# Patient Record
Sex: FEMALE | Race: WHITE | Employment: FULL TIME | URBAN - METROPOLITAN AREA
[De-identification: names, ages, dates, MRNs, and addresses within clinical notes are randomized per-mention and may not be internally consistent; named-entity substitution may affect disease eponyms.]

---

## 2017-02-15 ENCOUNTER — TELEPHONE (OUTPATIENT)
Dept: OBGYN | Facility: CLINIC | Age: 22
End: 2017-02-15

## 2017-02-15 ENCOUNTER — PRENATAL OFFICE VISIT (OUTPATIENT)
Dept: OBGYN | Facility: CLINIC | Age: 22
End: 2017-02-15
Payer: COMMERCIAL

## 2017-02-15 VITALS
HEART RATE: 86 BPM | SYSTOLIC BLOOD PRESSURE: 127 MMHG | TEMPERATURE: 98.9 F | DIASTOLIC BLOOD PRESSURE: 69 MMHG | BODY MASS INDEX: 32.28 KG/M2 | WEIGHT: 194 LBS

## 2017-02-15 DIAGNOSIS — Z32.01 POSITIVE PREGNANCY TEST: ICD-10-CM

## 2017-02-15 DIAGNOSIS — Z34.01 ENCOUNTER FOR SUPERVISION OF NORMAL FIRST PREGNANCY IN FIRST TRIMESTER: Primary | ICD-10-CM

## 2017-02-15 DIAGNOSIS — B35.4 DERMATOPHYTOSIS OF BODY: ICD-10-CM

## 2017-02-15 LAB
ABO + RH BLD: NORMAL
ABO + RH BLD: NORMAL
BETA HCG QUAL IFA URINE: POSITIVE
BLD GP AB SCN SERPL QL: NORMAL
BLOOD BANK CMNT PATIENT-IMP: NORMAL
ERYTHROCYTE [DISTWIDTH] IN BLOOD BY AUTOMATED COUNT: 13.1 % (ref 10–15)
HBV SURFACE AG SERPL QL IA: NONREACTIVE
HCT VFR BLD AUTO: 38 % (ref 35–47)
HGB BLD-MCNC: 13.1 G/DL (ref 11.7–15.7)
HIV 1+2 AB+HIV1 P24 AG SERPL QL IA: NORMAL
MCH RBC QN AUTO: 27.8 PG (ref 26.5–33)
MCHC RBC AUTO-ENTMCNC: 34.5 G/DL (ref 31.5–36.5)
MCV RBC AUTO: 81 FL (ref 78–100)
PLATELET # BLD AUTO: 240 10E9/L (ref 150–450)
RBC # BLD AUTO: 4.72 10E12/L (ref 3.8–5.2)
SPECIMEN EXP DATE BLD: NORMAL
T PALLIDUM IGG+IGM SER QL: NEGATIVE
WBC # BLD AUTO: 7.6 10E9/L (ref 4–11)

## 2017-02-15 PROCEDURE — 36415 COLL VENOUS BLD VENIPUNCTURE: CPT | Performed by: OBSTETRICS & GYNECOLOGY

## 2017-02-15 PROCEDURE — 86780 TREPONEMA PALLIDUM: CPT | Performed by: OBSTETRICS & GYNECOLOGY

## 2017-02-15 PROCEDURE — 86901 BLOOD TYPING SEROLOGIC RH(D): CPT | Performed by: OBSTETRICS & GYNECOLOGY

## 2017-02-15 PROCEDURE — 86850 RBC ANTIBODY SCREEN: CPT | Performed by: OBSTETRICS & GYNECOLOGY

## 2017-02-15 PROCEDURE — 87491 CHLMYD TRACH DNA AMP PROBE: CPT | Performed by: OBSTETRICS & GYNECOLOGY

## 2017-02-15 PROCEDURE — 87389 HIV-1 AG W/HIV-1&-2 AB AG IA: CPT | Performed by: OBSTETRICS & GYNECOLOGY

## 2017-02-15 PROCEDURE — 99207 ZZC FIRST OB VISIT: CPT | Performed by: OBSTETRICS & GYNECOLOGY

## 2017-02-15 PROCEDURE — 87591 N.GONORRHOEAE DNA AMP PROB: CPT | Performed by: OBSTETRICS & GYNECOLOGY

## 2017-02-15 PROCEDURE — 87340 HEPATITIS B SURFACE AG IA: CPT | Performed by: OBSTETRICS & GYNECOLOGY

## 2017-02-15 PROCEDURE — 86900 BLOOD TYPING SEROLOGIC ABO: CPT | Performed by: OBSTETRICS & GYNECOLOGY

## 2017-02-15 PROCEDURE — 86762 RUBELLA ANTIBODY: CPT | Performed by: OBSTETRICS & GYNECOLOGY

## 2017-02-15 PROCEDURE — 85027 COMPLETE CBC AUTOMATED: CPT | Performed by: OBSTETRICS & GYNECOLOGY

## 2017-02-15 PROCEDURE — 84703 CHORIONIC GONADOTROPIN ASSAY: CPT | Performed by: OBSTETRICS & GYNECOLOGY

## 2017-02-15 RX ORDER — KETOCONAZOLE 20 MG/ML
SHAMPOO TOPICAL
Qty: 120 ML | Refills: 1 | Status: SHIPPED | OUTPATIENT
Start: 2017-02-15 | End: 2017-06-21

## 2017-02-15 NOTE — NURSING NOTE
"Chief Complaint   Patient presents with     Prenatal Care     1st OB visit     Confirmation Of Pregnancy       Initial /69 (BP Location: Left arm, Patient Position: Chair, Cuff Size: Adult Regular)  Pulse 86  Temp 98.9  F (37.2  C) (Oral)  Wt 194 lb (88 kg)  LMP 12/14/2016 (Exact Date)  BMI 32.28 kg/m2 Estimated body mass index is 32.28 kg/(m^2) as calculated from the following:    Height as of 4/21/15: 5' 5\" (1.651 m).    Weight as of this encounter: 194 lb (88 kg).  Medication Reconciliation: complete    "

## 2017-02-15 NOTE — PATIENT INSTRUCTIONS
If you have any questions regarding your visit, Please contact your care team.     [employer] Access Services: [phone]    [facility] CLINIC HOURS TELEPHONE NUMBER   KAREN Deyr-    Jerald Ospina-MARGARET Li-Medical Assistant   Monday-[place]  8:00a.m-4:45 p.m  Wednesday-[place] 8:00a.m-4:45 p.m.  Thursday-[place]  8:00a.m-4:45 p.m.  Friday-[place]  8:00a.m-4:45 p.m. [facility]  [address]  [place], [place] [address] [phone] ask [facility]  [phone] Fax  Imaging Uamzebfogc-[phone]    [facility] Labor and Delivery  [address] [place] [address] Dr.  [place], [place] [address] [phone]    [facility]  [address] [place] [address]  [phone] ask [facility]  [phone] Fax  Imaging Akllgnyfra-[phone]     Urgent Care locations:    [place]        [place] Monday-Friday  5 pm - 9 pm  Saturday and Sunday   9 am - 5 pm    Monday-Friday   11 am - 9 pm  Saturday and Sunday   9 am - 5 pm   [phone]       If you need a medication refill, please contact your pharmacy. Please allow 3 business days for your refill to be completed.  As always, Thank you for trusting us with your healthcare needs!

## 2017-02-15 NOTE — TELEPHONE ENCOUNTER
ketoconazole (NIZORAL) 2 % shampoo 120 mL 1 2/15/2017  No   Sig: Apply to the affected area and wash off after 5 minutes     Unsure how many times a week Dr. Ruiz wants pt to use this shampoo.    Will route to Dr. Ruiz for review & orders. Anai Owens RN, BAN

## 2017-02-15 NOTE — TELEPHONE ENCOUNTER
Re: KETOCONAZOLE SHAMPOO    Question on how often to use shampoo    To clarify for quantity    nataly (Pharmacy) [phone]       Ok to leave message

## 2017-02-15 NOTE — MR AVS SNAPSHOT
After Visit Summary   2/15/2017    Nayla Mcmanus    MRN: [id number]           Patient Information     Date Of Birth          1995        Visit Information        Provider Department      2/15/2017 8:30 AM Ankur Ruiz MD [facility]        Today's Diagnoses     Positive pregnancy test    -  1      Care Instructions                                                        If you have any questions regarding your visit, Please contact your care team.     Critical access hospital Services: [phone]    [facility] CLINIC HOURS TELEPHONE NUMBER   KAREN Dyer-    Jerald Ospina-MARGARET Li-Medical Assistant   Monday-[place]  8:00a.m-4:45 p.m  Wednesday-[place] 8:00a.m-4:45 p.m.  Thursday-[place]  8:00a.m-4:45 p.m.  Friday-[place]  8:00a.m-4:45 p.m. [facility]  [address]  [place], [place] [address] [phone] ask for [facility]  [phone] Fax  Imaging [id number]-[phone]    [facility] Labor and Delivery  [address] [place] [address] Dr.  [place], [place] [address] [phone]    [facility]  [address] [place], [place] [address] [phone] ask [facility]  [phone] Fax  Imaging [phone]     Urgent Care locations:    [facility] Monday-Friday  5 pm - 9 pm  Saturday and Sunday   9 am - 5 pm    Monday-Friday   11 am - 9 pm  Saturday and Sunday   9 am - 5 pm   [phone]       If you need a medication refill, please contact your pharmacy. Please allow 3 business days for your refill to be completed.  As always, Thank you for trusting us with your healthcare needs!          Follow-ups after your visit        Who to contact     If you have questions or need follow up information about today's clinic visit or your schedule please contact [facility] directly at [phone].  Normal or non-critical  "lab and imaging results will be communicated to you by MyChart, letter or phone within 4 business days after the clinic has received the results. If you do not hear from us within 7 days, please contact the clinic through [employer] or phone. If you have a critical or abnormal lab result, we will notify you by phone as soon as possible.  Submit refill requests through DelaGet or call your pharmacy and they will forward the refill request to us. Please allow 3 business days for your refill to be completed.          Additional Information About Your Visit        Dine[employer] Information     DelaGet lets you send messages to your doctor, view your test results, renew your prescriptions, schedule appointments and more. To sign up, go to [email or web address] . Click on \"Log in\" on the left side of the screen, which will take you to the Welcome page. Then click on \"Sign up Now\" on the right side of the page.     You will be asked to enter the access code listed below, as well as some personal information. Please follow the directions to create your username and password.     Your access code is: [email or web address]  Expires: 2017  8:51 AM     Your access code will  in 90 days. If you need help or a new code, please call your [place] clinic or [phone].        Care EveryWhere ID     This is your Care EveryWhere ID. This could be used by other organizations to access your [place] medical records  [id number]        Your Vitals Were     Pulse Temperature Last Period BMI (Body Mass Index)          86 98.9  F (37.2  C) (Oral) 2016 (Exact Date) 32.28 kg/m2         Blood Pressure from Last 3 Encounters:   02/15/17 127/69   10/24/16 121/76   04/15/16 126/73    Weight from Last 3 Encounters:   02/15/17 194 lb (88 kg)   10/24/16 195 lb 12.8 oz (88.8 kg)   04/15/16 186 lb 9.6 oz (84.6 kg)              We Performed the Following     Beta HCG qual IFA urine        Primary Care Provider Office Phone # Fax #    Emily Jarrett, " [phone]       [place] [address]           [facility] [address]        Thank you!     Thank you for choosing [facility]  for your care. Our goal is always to provide you with excellent care. Hearing back from our patients is one way we can continue to improve our services. Please take a few minutes to complete the written survey that you may receive in the mail after your visit with us. Thank you!             Your Updated Medication List - Protect others around you: Learn how to safely use, store and throw away your medicines at www.disposemymeds.org.      Notice  As of 2/15/2017  8:51 AM    You have not been prescribed any medications.

## 2017-02-16 ENCOUNTER — RADIANT APPOINTMENT (OUTPATIENT)
Dept: ULTRASOUND IMAGING | Facility: CLINIC | Age: 22
End: 2017-02-16
Attending: OBSTETRICS & GYNECOLOGY
Payer: COMMERCIAL

## 2017-02-16 DIAGNOSIS — Z32.01 POSITIVE PREGNANCY TEST: ICD-10-CM

## 2017-02-16 PROBLEM — Z34.80 SUPERVISION OF OTHER NORMAL PREGNANCY, ANTEPARTUM: Status: ACTIVE | Noted: 2017-02-16

## 2017-02-16 PROBLEM — Z29.13 NEED FOR RHOGAM DUE TO RH NEGATIVE MOTHER: Status: ACTIVE | Noted: 2017-02-16

## 2017-02-16 LAB
C TRACH DNA SPEC QL NAA+PROBE: NORMAL
N GONORRHOEA DNA SPEC QL NAA+PROBE: NORMAL
RUBV IGG SERPL IA-ACNC: 67 IU/ML
SPECIMEN SOURCE: NORMAL
SPECIMEN SOURCE: NORMAL

## 2017-02-16 PROCEDURE — 76801 OB US < 14 WKS SINGLE FETUS: CPT | Performed by: RADIOLOGY

## 2017-02-16 NOTE — TELEPHONE ENCOUNTER
ketoconazole (NIZORAL) 2 % shampoo 120 mL 1 2/15/2017  No   Sig: Apply to the affected area and wash off after 5 minutes     Phone call to pharmacist, Janine, at [phone]. Gave her orders per Dr. Ruiz as below. Anai Owens RN, BAN

## 2017-02-17 NOTE — PROGRESS NOTES
"\"O LAY see uh\" Nayla Mcmanus is a 21 year old year old G 2 P 0 who presents for pregnancy confirmation and an initial obstetrical visit.  Referred by sisters/pts.    Currently experiencing normal pregnancy symptoms without particular problems including pain, bleeding, marked vomiting or weight loss except: daily N/V.  This was a semi-planned pregnancy. LMP 12/14. Menses every 28-29 d.   Here today alone.   Additional concerns: history of SAB x 1, recurrent skin fungal rash on chest and arms.     ROS:     Systems reviewed include constitutional; breast;                 cardiac; respiratory; gastrointestinal; genitourinary;                                musculoskeletal; integumentary; psychological;                                hematologic/lymphatic and endocrine.                  These systems were negative for significant symptoms except                 for the following: none and see above HPI.    Past medical, obstetrical, surgical, family and social history reviewed and as noted or updated in chart.     Allergies, meds and supplements are as noted or updated in chart.                    Episode OB dating, demographic and history forms completed or reviewed.    EXAM:  VS as noted. BMI- Body mass index is 32.28 kg/(m^2).    Relatively recent normal general exam- not repeated now.       ASSESSMENT: (Z32.01) Positive pregnancy test  (primary encounter diagnosis)  Comment:   Plan: Beta HCG qual IFA urine, CBC WITH PLATELETS,         HIV Antigen Antibody Combo, Rubella Antibody         IgG Quantitative, Hepatitis B surface antigen,         Anti Treponema, ABO/RH TYPE AND SCREEN,         NEISSERIA GONORRHOEA PCR, CHLAMYDIA TRACHOMATIS        PCR, CANCELED:  OB <14 Weeks w Transvaginal         Single            (Z34.01) Encounter for supervision of normal first pregnancy in first trimester  Comment:   Plan: CBC WITH PLATELETS, HIV Antigen Antibody Combo,        Rubella Antibody IgG Quantitative, Hepatitis B        "  surface antigen, Anti Treponema, ABO/RH TYPE         AND SCREEN, NEISSERIA GONORRHOEA PCR, CHLAMYDIA        TRACHOMATIS PCR            (B35.4) Dermatophytosis of body  Comment:   Plan: ketoconazole (NIZORAL) 2 % shampoo               PLAN:  Anticipatory guidance as noted. Symptoms, problems and concerns reviewed. Recommended weight gain discussed. Problem list initiated. Check u/s now. Orders as noted. RTC in 4 weeks. UC, possible Pap, and discuss special screening tests next.    Ankur Ruiz MD

## 2017-03-16 ENCOUNTER — PRENATAL OFFICE VISIT (OUTPATIENT)
Dept: OBGYN | Facility: CLINIC | Age: 22
End: 2017-03-16
Payer: COMMERCIAL

## 2017-03-16 VITALS
SYSTOLIC BLOOD PRESSURE: 123 MMHG | WEIGHT: 192 LBS | HEART RATE: 90 BPM | BODY MASS INDEX: 31.95 KG/M2 | DIASTOLIC BLOOD PRESSURE: 71 MMHG | TEMPERATURE: 98.7 F

## 2017-03-16 DIAGNOSIS — Z34.80 SUPERVISION OF OTHER NORMAL PREGNANCY, ANTEPARTUM: Primary | ICD-10-CM

## 2017-03-16 DIAGNOSIS — Z29.13 NEED FOR RHOGAM DUE TO RH NEGATIVE MOTHER: ICD-10-CM

## 2017-03-16 PROCEDURE — 99207 ZZC PRENATAL VISIT: CPT | Performed by: OBSTETRICS & GYNECOLOGY

## 2017-03-16 PROCEDURE — 87086 URINE CULTURE/COLONY COUNT: CPT | Performed by: OBSTETRICS & GYNECOLOGY

## 2017-03-16 NOTE — MR AVS SNAPSHOT
After Visit Summary   3/16/2017    Nayla Mcmanus    MRN: [id number]           Patient Information     Date Of Birth          1995        Visit Information        Provider Department      3/16/2017 3:45 PM Ankur Ruiz MD [facility]        Today's Diagnoses     Supervision of other normal pregnancy, antepartum        Need for rhogam due to Rh negative mother          Care Instructions                                                        If you have any questions regarding your visit, Please contact your care team.     [facility] Access Services: [phone]    [facility] CLINIC HOURS TELEPHONE NUMBER   Ankur Ruiz M.D.      Courtney-    Jerald Ospina-MARGARET Li-Medical Assistant   Monday-[place]  8:00a.m-4:45 p.m  Wednesday-[place] 8:00a.m-4:45 p.m.  Thursday-[place]  8:00a.m-4:45 p.m.  Friday-[place]  8:00a.m-4:45 p.m. [facility]  [address] [place] [address]  [phone] ask for [facility]  [phone] Fax  Imaging [phone]    [facility] Labor and Delivery  [address] [place] [address] [place] [address]  [phone]    [facility]  [address] [place] [address]  [phone] ask for [facility]  [phone] Fax  Imaging [phone]     Urgent Care locations:    [facility] Monday-Friday  5 pm - 9 pm  Saturday and Sunday   9 am - 5 pm    Monday-Friday   11 am - 9 pm  Saturday and Sunday   9 am - 5 pm   [phone]       If you need a medication refill, please contact your pharmacy. Please allow 3 business days for your refill to be completed.  As always, Thank you for trusting us with your healthcare needs!          Follow-ups after your visit        Who to contact     If you have questions or need follow up information about today's clinic visit or your schedule please contact [place]  [facility] directly at [phone].  Normal or non-critical lab and imaging results will be communicated to you by MyChart, letter or phone within 4 business days after the clinic has received the results. If you do not hear from us within 7 days, please contact the clinic through Offsite Care Resourceshart or phone. If you have a critical or abnormal lab result, we will notify you by phone as soon as possible.  Submit refill requests through [employer] or call your pharmacy and they will forward the refill request to us. Please allow 3 business days for your refill to be completed.          Additional Information About Your Visit        Off[employer] Care Resourceshar[employer]ET [employer] Information     [employer] lets you send messages to your doctor, view your test results, renew your prescriptions, schedule appointments and more. To sign up, go to [email or web address] . Click on \"Log in\" on the left side of the screen, which will take you to the Welcome page. Then click on \"Sign up Now\" on the right side of the page.     You will be asked to enter the access code listed below, as well as some personal information. Please follow the directions to create your username and password.     Your access code is: [email or web address]  Expires: 2017  9:51 AM     Your access code will  in 90 days. If you need help or a new code, please call your [place] clinic or [phone].        Care EveryWhere ID     This is your Care EveryWhere ID. This could be used by other organizations to access your [place] medical records  [id number]        Your Vitals Were     Pulse Temperature Last Period Breastfeeding? BMI (Body Mass Index)       90 98.7  F (37.1  C) (Oral) 2016 (Exact Date) No 31.95 kg/m2        Blood Pressure from Last 3 Encounters:   17 123/71   02/15/17 127/69   10/24/16 121/76    Weight from Last 3 Encounters:   17 192 lb (87.1 kg)   02/15/17 194 lb (88 kg)   10/24/16 195 lb 12.8 oz (88.8 kg)              Today, you had the following     No orders " found for display       Primary Care Provider Office Phone # Fax #    Emily Jarrett [phone]       [address] [address]        Thank you!     Thank you for choosing [facility]  for your care. Our goal is always to provide you with excellent care. Hearing back from our patients is one way we can continue to improve our services. Please take a few minutes to complete the written survey that you may receive in the mail after your visit with us. Thank you!             Your Updated Medication List - Protect others around you: Learn how to safely use, store and throw away your medicines at www.disposemymeds.org.          This list is accurate as of: 3/16/17  3:52 PM.  Always use your most recent med list.                   Brand Name Dispense Instructions for use    ketoconazole 2 % shampoo    NIZORAL    120 mL    Apply to the affected area and wash off after 5 minutes.

## 2017-03-16 NOTE — PATIENT INSTRUCTIONS
If you have any questions regarding your visit, Please contact your care team.     "[employer] Access Services: [phone]    [facility] CLINIC HOURS TELEPHONE NUMBER   KAREN Dyer-    Jerald Ospina-MARGARET Li-Medical Assistant   Monday-[place]  8:00a.m-4:45 p.m  Wednesday-[place] 8:00a.m-4:45 p.m.  Thursday-[place]  8:00a.m-4:45 p.m.  Friday-[place]  8:00a.m-4:45 p.m. [facility]  [address]  [place], [place] [address] [phone] ask [facility]  [phone] Fax  Imaging [id number]-[phone]    [facility] Labor and Delivery  [address] [place] [address] Dr.  [place], [place] [address] [phone]    [facility]  [address] [place] [address]  [phone] ask [facility]  [phone] Fax  Imaging Zsosxockym-[phone]     Urgent Care locations:    [place]        [place] Monday-Friday  5 pm - 9 pm  Saturday and Sunday   9 am - 5 pm    Monday-Friday   11 am - 9 pm  Saturday and Sunday   9 am - 5 pm   [phone]       If you need a medication refill, please contact your pharmacy. Please allow 3 business days for your refill to be completed.  As always, Thank you for trusting us with your healthcare needs!

## 2017-03-16 NOTE — NURSING NOTE
"Chief Complaint   Patient presents with     Prenatal Care     OBV 13w1d       Initial /71 (BP Location: Left arm, Patient Position: Chair, Cuff Size: Adult Regular)  Pulse 90  Temp 98.7  F (37.1  C) (Oral)  Wt 192 lb (87.1 kg)  LMP 12/14/2016 (Exact Date)  Breastfeeding? No  BMI 31.95 kg/m2 Estimated body mass index is 31.95 kg/(m^2) as calculated from the following:    Height as of 4/21/15: 5' 5\" (1.651 m).    Weight as of this encounter: 192 lb (87.1 kg).  Medication Reconciliation: complete   Guillermo Engel CMA      "

## 2017-03-17 LAB
BACTERIA SPEC CULT: NORMAL
MICRO REPORT STATUS: NORMAL
SPECIMEN SOURCE: NORMAL

## 2017-03-17 NOTE — PROGRESS NOTES
Doing well. No signif signs, symptoms or concerns except rash seems better and will defer topical shampoo treatment.Plan to do Pap scr pp.   Discussed special diagnostic and screening tests and plans (y = yes, n = no/declined): quad scr = n, survey u/s = y, Level 2 survey u/s with MFM = n, CF carrier scr = n, hemoglobinopathy or thalassemia scr= n, SMA, fragile X  and other genetic scr= n, 1st trimester scr with NT and later AFP or with cell free fetal DNA and later AFP = n, genetic amnio/CVS = n.  Advice as per Checklist update. Discussed condition, tests and care plan including RBA. Problem list updated.   Ankur Ruiz MD

## 2017-04-13 ENCOUNTER — PRENATAL OFFICE VISIT (OUTPATIENT)
Dept: OBGYN | Facility: CLINIC | Age: 22
End: 2017-04-13
Payer: COMMERCIAL

## 2017-04-13 VITALS
DIASTOLIC BLOOD PRESSURE: 73 MMHG | BODY MASS INDEX: 31.45 KG/M2 | HEART RATE: 84 BPM | SYSTOLIC BLOOD PRESSURE: 123 MMHG | WEIGHT: 189 LBS | TEMPERATURE: 98.6 F

## 2017-04-13 DIAGNOSIS — Z29.13 NEED FOR RHOGAM DUE TO RH NEGATIVE MOTHER: ICD-10-CM

## 2017-04-13 DIAGNOSIS — Z34.80 SUPERVISION OF OTHER NORMAL PREGNANCY, ANTEPARTUM: ICD-10-CM

## 2017-04-13 PROCEDURE — 99207 ZZC PRENATAL VISIT: CPT | Performed by: OBSTETRICS & GYNECOLOGY

## 2017-04-13 RX ORDER — PRENATAL VIT/IRON FUM/FOLIC AC 27MG-0.8MG
1 TABLET ORAL DAILY
COMMUNITY

## 2017-04-13 NOTE — PATIENT INSTRUCTIONS
If you have any questions regarding your visit, Please contact your care team.     [employer] Access Services: [phone]    [facility] CLINIC HOURS TELEPHONE NUMBER   KAREN Dyer-    Jerald Ospina-MARGARET Li-Medical Assistant   Monday-[place]  8:00a.m-4:45 p.m  Wednesday-[place] 8:00a.m-4:45 p.m.  Thursday-[place]  8:00a.m-4:45 p.m.  Friday-[place]  8:00a.m-4:45 p.m. [facility]  [address]  [place], [place] [address] [phone] ask [facility]  [phone] Fax  Imaging [id number]-[phone]    [facility] Labor and Delivery  [address] [place] [address] Dr.  [place], [place] [address] [phone]    [facility]  [address] [place] [address]  [phone] ask [facility]  [phone] Fax  Imaging Fectguwush-[phone]     Urgent Care locations:    [place]        [place] Monday-Friday  5 pm - 9 pm  Saturday and Sunday   9 am - 5 pm    Monday-Friday   11 am - 9 pm  Saturday and Sunday   9 am - 5 pm   [phone]       If you need a medication refill, please contact your pharmacy. Please allow 3 business days for your refill to be completed.  As always, Thank you for trusting us with your healthcare needs!

## 2017-04-13 NOTE — MR AVS SNAPSHOT
After Visit Summary   4/13/2017    Nayla Mcmanus    MRN: [id number]           Patient Information     Date Of Birth          1995        Visit Information        Provider Department      4/13/2017 3:45 PM Ankur Ruiz MD [facility]        Today's Diagnoses     Supervision of other normal pregnancy, antepartum        Need for rhogam due to Rh negative mother          Care Instructions                                                        If you have any questions regarding your visit, Please contact your care team.     [facility] Access Services: [phone]    [facility] CLINIC HOURS TELEPHONE NUMBER   Ankur Ruiz M.D.      Courtney-    Jerald Ospina-MARGARET Li-Medical Assistant   Monday-[place]  8:00a.m-4:45 p.m  Wednesday-[place] 8:00a.m-4:45 p.m.  Thursday-[place]  8:00a.m-4:45 p.m.  Friday-[place]  8:00a.m-4:45 p.m. [facility]  [address] [place] [address]  [phone] ask for [facility]  [phone] Fax  Imaging [phone]    [facility] Labor and Delivery  [address] [place] [address] [place] [address]  [phone]    [facility]  [address] [place] [address]  [phone] ask for [facility]  [phone] Fax  Imaging [id number]-[phone]     Urgent Care locations:    [facility] Monday-Friday  5 pm - 9 pm  Saturday and Sunday   9 am - 5 pm    Monday-Friday   11 am - 9 pm  Saturday and Sunday   9 am - 5 pm   [phone]       If you need a medication refill, please contact your pharmacy. Please allow 3 business days for your refill to be completed.  As always, Thank you for trusting us with your healthcare needs!          Follow-ups after your visit        Who to contact     If you have questions or need follow up information about today's clinic visit or your schedule please contact [place]  [facility] directly at [phone].  Normal or non-critical lab and imaging results will be communicated to you by MyChart, letter or phone within 4 business days after the clinic has received the results. If you do not hear from us within 7 days, please contact the clinic through [employer] Capital[employer] or phone. If you have a critical or abnormal lab result, we will notify you by phone as soon as possible.  Submit refill requests through Answer.To or call your pharmacy and they will forward the refill request to us. Please allow 3 business days for your refill to be completed.          Additional Information About Your Visit        [employer] Information     Answer.To gives you secure access to your electronic health record. If you see a primary care provider, you can also send messages to your care team and make appointments. If you have questions, please call your primary care clinic.  If you do not have a primary care provider, please call [phone] and they will assist you.        Care EveryWhere ID     This is your Care EveryWhere ID. This could be used by other organizations to access your [place] medical records  [id number]        Your Vitals Were     Pulse Temperature Last Period Breastfeeding? BMI (Body Mass Index)       84 98.6  F (37  C) (Oral) 12/14/2016 (Exact Date) No 31.45 kg/m2        Blood Pressure from Last 3 Encounters:   04/13/17 123/73   03/16/17 123/71   02/15/17 127/69    Weight from Last 3 Encounters:   04/13/17 189 lb (85.7 kg)   03/16/17 192 lb (87.1 kg)   02/15/17 194 lb (88 kg)              Today, you had the following     No orders found for display       Primary Care Provider Office Phone # Fax #    Emily Jarrett [phone]       [address] [address]        Thank you!     Thank you for choosing [facility]  for your care. Our goal is always to provide you with excellent care. Hearing back from our patients  is one way we can continue to improve our services. Please take a few minutes to complete the written survey that you may receive in the mail after your visit with us. Thank you!             Your Updated Medication List - Protect others around you: Learn how to safely use, store and throw away your medicines at www.disposemymeds.org.          This list is accurate as of: 4/13/17  3:50 PM.  Always use your most recent med list.                   Brand Name Dispense Instructions for use    ketoconazole 2 % shampoo    NIZORAL    120 mL    Apply to the affected area and wash off after 5 minutes.       prenatal multivitamin  plus iron 27-0.8 MG Tabs per tablet      Take 1 tablet by mouth daily

## 2017-04-13 NOTE — NURSING NOTE
"Chief Complaint   Patient presents with     Prenatal Care     OBV 17w1d       Initial /73 (BP Location: Left arm, Patient Position: Chair, Cuff Size: Adult Regular)  Pulse 84  Temp 98.6  F (37  C) (Oral)  Wt 189 lb (85.7 kg)  LMP 12/14/2016 (Exact Date)  Breastfeeding? No  BMI 31.45 kg/m2 Estimated body mass index is 31.45 kg/(m^2) as calculated from the following:    Height as of 4/21/15: 5' 5\" (1.651 m).    Weight as of this encounter: 189 lb (85.7 kg).  Medication Reconciliation: complete   Guillermo Engel CMA      "

## 2017-04-14 NOTE — PROGRESS NOTES
No signif signs, symptoms or concerns except poor sleep. Advice appropriate to gestational age reviewed including pertinent Checklist items. Discussed condition, tests and care plan including RBA. Problem list updated. Survey u/s next.  A/P:  Nayla was seen today for prenatal care.    Diagnoses and all orders for this visit:    Supervision of other normal pregnancy, antepartum  -     US OB > 14 Weeks Complete Single; Future    Need for rhogam due to Rh negative mother        Ankur Ruiz MD

## 2017-05-10 ENCOUNTER — RADIANT APPOINTMENT (OUTPATIENT)
Dept: ULTRASOUND IMAGING | Facility: CLINIC | Age: 22
End: 2017-05-10
Attending: OBSTETRICS & GYNECOLOGY
Payer: COMMERCIAL

## 2017-05-10 DIAGNOSIS — Z34.80 SUPERVISION OF OTHER NORMAL PREGNANCY, ANTEPARTUM: ICD-10-CM

## 2017-05-10 PROCEDURE — 76805 OB US >/= 14 WKS SNGL FETUS: CPT

## 2017-05-18 ENCOUNTER — PRENATAL OFFICE VISIT (OUTPATIENT)
Dept: OBGYN | Facility: CLINIC | Age: 22
End: 2017-05-18
Payer: COMMERCIAL

## 2017-05-18 VITALS
WEIGHT: 189 LBS | TEMPERATURE: 97.8 F | HEART RATE: 83 BPM | BODY MASS INDEX: 31.45 KG/M2 | DIASTOLIC BLOOD PRESSURE: 74 MMHG | SYSTOLIC BLOOD PRESSURE: 124 MMHG

## 2017-05-18 DIAGNOSIS — Z29.13 NEED FOR RHOGAM DUE TO RH NEGATIVE MOTHER: ICD-10-CM

## 2017-05-18 DIAGNOSIS — Z34.80 SUPERVISION OF OTHER NORMAL PREGNANCY, ANTEPARTUM: ICD-10-CM

## 2017-05-18 PROCEDURE — 99207 ZZC PRENATAL VISIT: CPT | Performed by: OBSTETRICS & GYNECOLOGY

## 2017-05-18 NOTE — NURSING NOTE
"Chief Complaint   Patient presents with     Prenatal Care     OBV 22w1d       Initial /74 (BP Location: Left arm, Patient Position: Chair, Cuff Size: Adult Regular)  Pulse 83  Temp 97.8  F (36.6  C) (Oral)  Wt 189 lb (85.7 kg)  LMP 12/14/2016 (Exact Date)  Breastfeeding? No  BMI 31.45 kg/m2 Estimated body mass index is 31.45 kg/(m^2) as calculated from the following:    Height as of 4/21/15: 5' 5\" (1.651 m).    Weight as of this encounter: 189 lb (85.7 kg).  Medication Reconciliation: complete   Guillermo Engel CMA      "

## 2017-05-18 NOTE — PATIENT INSTRUCTIONS
If you have any questions regarding your visit, Please contact your care team.     [employer] Access Services: [phone]    [facility] CLINIC HOURS TELEPHONE NUMBER   KAREN Dyer-    Jerald Ospina-MARGARET Li-Medical Assistant   Monday-[place]  8:00a.m-4:45 p.m  Wednesday-[place] 8:00a.m-4:45 p.m.  Thursday-[place]  8:00a.m-4:45 p.m.  Friday-[place]  8:00a.m-4:45 p.m. [facility]  [address]  [place], [place] [address] [phone] ask [facility] [place] [facility]  [phone] Fax  Imaging [phone]    [facility] Labor and Delivery  [address] [place] [address] Dr.  [place], [place] [address] [phone]    [facility]  [address] [place] [address]  [phone] ask [facility]  [phone] Fax  Imaging [phone]     Urgent Care locations:    [place]        [place] Monday-Friday  5 pm - 9 pm  Saturday and Sunday   9 am - 5 pm    Monday-Friday   11 am - 9 pm  Saturday and Sunday   9 am - 5 pm   [phone]       If you need a medication refill, please contact your pharmacy. Please allow 3 business days for your refill to be completed.  As always, Thank you for trusting us with your healthcare needs!

## 2017-05-18 NOTE — PROGRESS NOTES
No signif signs, symptoms or concerns. Advice appropriate to gestational age reviewed including pertinent Checklist items. Discussed condition, tests and care plan including RBA. Problem list updated. 1h GTT and Rhogam next.  A/P:  Nayla was seen today for prenatal care.    Diagnoses and all orders for this visit:    Supervision of other normal pregnancy, antepartum    Need for rhogam due to Rh negative mother        Ankur Ruiz MD

## 2017-05-18 NOTE — MR AVS SNAPSHOT
After Visit Summary   5/18/2017    Nayla Mcmanus    MRN: [id number]           Patient Information     Date Of Birth          1995        Visit Information        Provider Department      5/18/2017 8:15 AM Ankur Ruiz MD [facility]        Today's Diagnoses     Supervision of other normal pregnancy, antepartum        Need for rhogam due to Rh negative mother          Care Instructions                                                        If you have any questions regarding your visit, Please contact your care team.     [facility] Access Services: [phone]    [facility]   Ankur Ruiz M.D.      [place]-    Jerald Ospina-MARGARET Li-Medical Assistant   Monday-[place]  8:00a.m-4:45 p.m  Wednesday-[place] 8:00a.m-4:45 p.m.  Thursday-[place]  8:00a.m-4:45 p.m.  Friday-[place]  8:00a.m-4:45 p.m. [facility]  [address] [place], [place] [address] [address]  [phone] ask for [facility]  [phone] Fax  Imaging [phone]    [facility] Labor and Delivery  [address] [place] [address] [place] [address]  [phone]    [facility]  [address] [place] [address]  [phone] ask for [facility]  [phone] Fax  Imaging [phone]     Urgent Care locations:    [facility] Monday-Friday  5 pm - 9 pm  Saturday and Sunday   9 am - 5 pm    Monday-Friday   11 am - 9 pm  Saturday and Sunday   9 am - 5 pm   [phone]       If you need a medication refill, please contact your pharmacy. Please allow 3 business days for your refill to be completed.  As always, Thank you for trusting us with your healthcare needs!          Follow-ups after your visit        Who to contact     If you have questions or need follow up information about today's clinic visit or your schedule please contact [place]  [facility] directly at [phone].  Normal or non-critical lab and imaging results will be communicated to you by MyChart, letter or phone within 4 business days after the clinic has received the results. If you do not hear from us within 7 days, please contact the clinic through [employer] or phone. If you have a critical or abnormal lab result, we will notify you by phone as soon as possible.  Submit refill requests through [employer] or call your pharmacy and they will forward the refill request to us. Please allow 3 business days for your refill to be completed.          Additional Information About Your Visit        [employer] Information     [employer] gives you secure access to your electronic health record. If you see a primary care provider, you can also send messages to your care team and make appointments. If you have questions, please call your primary care clinic.  If you do not have a primary care provider, please call [phone] and they will assist you.        Care EveryWhere ID     This is your Care EveryWhere ID. This could be used by other organizations to access your [place] medical records  [id number]        Your Vitals Were     Pulse Temperature Last Period Breastfeeding? BMI (Body Mass Index)       83 97.8  F (36.6  C) (Oral) 12/14/2016 (Exact Date) No 31.45 kg/m2        Blood Pressure from Last 3 Encounters:   05/18/17 124/74   04/13/17 123/73   03/16/17 123/71    Weight from Last 3 Encounters:   05/18/17 189 lb (85.7 kg)   04/13/17 189 lb (85.7 kg)   03/16/17 192 lb (87.1 kg)              Today, you had the following     No orders found for display       Primary Care Provider Office Phone # Fax #    Emily Jarrett [phone]       [address] [address]        Thank you!     Thank you for choosing [facility]  for your care. Our goal is always to provide you with excellent care. Hearing back from our  patients is one way we can continue to improve our services. Please take a few minutes to complete the written survey that you may receive in the mail after your visit with us. Thank you!             Your Updated Medication List - Protect others around you: Learn how to safely use, store and throw away your medicines at www.disposemymeds.org.          This list is accurate as of: 5/18/17  8:16 AM.  Always use your most recent med list.                   Brand Name Dispense Instructions for use    ketoconazole 2 % shampoo    NIZORAL    120 mL    Apply to the affected area and wash off after 5 minutes.       prenatal multivitamin  plus iron 27-0.8 MG Tabs per tablet      Take 1 tablet by mouth daily

## 2017-06-21 ENCOUNTER — PRENATAL OFFICE VISIT (OUTPATIENT)
Dept: OBGYN | Facility: CLINIC | Age: 22
End: 2017-06-21
Payer: COMMERCIAL

## 2017-06-21 VITALS
HEART RATE: 74 BPM | SYSTOLIC BLOOD PRESSURE: 120 MMHG | BODY MASS INDEX: 32.28 KG/M2 | TEMPERATURE: 97.4 F | WEIGHT: 194 LBS | DIASTOLIC BLOOD PRESSURE: 74 MMHG

## 2017-06-21 DIAGNOSIS — Z29.13 NEED FOR RHOGAM DUE TO RH NEGATIVE MOTHER: ICD-10-CM

## 2017-06-21 DIAGNOSIS — Z34.80 SUPERVISION OF OTHER NORMAL PREGNANCY, ANTEPARTUM: Primary | ICD-10-CM

## 2017-06-21 LAB
BLD GP AB SCN SERPL QL: NORMAL
HGB BLD-MCNC: 11.3 G/DL (ref 11.7–15.7)

## 2017-06-21 PROCEDURE — 36415 COLL VENOUS BLD VENIPUNCTURE: CPT | Performed by: OBSTETRICS & GYNECOLOGY

## 2017-06-21 PROCEDURE — 86850 RBC ANTIBODY SCREEN: CPT | Performed by: OBSTETRICS & GYNECOLOGY

## 2017-06-21 PROCEDURE — 00000218 ZZHCL STATISTIC OBHBG - HEMOGLOBIN: Performed by: OBSTETRICS & GYNECOLOGY

## 2017-06-21 PROCEDURE — 96372 THER/PROPH/DIAG INJ SC/IM: CPT | Performed by: OBSTETRICS & GYNECOLOGY

## 2017-06-21 PROCEDURE — 99207 ZZC PRENATAL VISIT: CPT | Performed by: OBSTETRICS & GYNECOLOGY

## 2017-06-21 NOTE — PROGRESS NOTES
No signif signs, symptoms or concerns. Kept only a portion of the 1h glucola down and will try this test again on another day. Rhogam given. Advice appropriate to gestational age reviewed including pertinent Checklist items. Discussed condition, tests and care plan including RBA. Problem list updated.   A/P:  Nayla was seen today for prenatal care.    Diagnoses and all orders for this visit:    Supervision of other normal pregnancy, antepartum  -     Antibody screen red cell  -     OB hemoglobin  -     RH IMMUNE GLOBULIN  -     INJECTION INTRAMUSCULAR OR SUB-Q  -     Glucose tolerance gest screen 1 hour; Future    Need for rhogam due to Rh negative mother  -     Antibody screen red cell  -     RH IMMUNE GLOBULIN  -     INJECTION INTRAMUSCULAR OR SUB-Q    Other orders  -     Cancel: Glucose tolerance gest screen 1 hour        Ankur Ruiz MD

## 2017-06-21 NOTE — PATIENT INSTRUCTIONS
If you have any questions regarding your visit, Please contact your care team.     [employer] Access Services: [phone]    [facility] CLINIC HOURS TELEPHONE NUMBER   KAREN Dyer-    Jerald Ospina-MARGARET Li-Medical Assistant   Monday-[place]  8:00a.m-4:45 p.m  Wednesday-[place] 8:00a.m-4:45 p.m.  Thursday-[place]  8:00a.m-4:45 p.m.  Friday-[place]  8:00a.m-4:45 p.m. [facility]  [address]  [place], [place] [address] [phone] ask [facility]  [phone] Fax  Imaging [phone]    [facility] Labor and Delivery  [address] [place] [address] Dr.  [place], [place] [address] [phone]    [facility]  [address] [place] [address]  [phone] ask [facility]  [phone] Fax  Imaging [phone]     Urgent Care locations:    [place]        [place] Monday-Friday  5 pm - 9 pm  Saturday and Sunday   9 am - 5 pm    Monday-Friday   11 am - 9 pm  Saturday and Sunday   9 am - 5 pm   [phone]       If you need a medication refill, please contact your pharmacy. Please allow 3 business days for your refill to be completed.  As always, Thank you for trusting us with your healthcare needs!

## 2017-06-21 NOTE — NURSING NOTE
"Chief Complaint   Patient presents with     Prenatal Care     OBV 27 weeks       Initial /74 (BP Location: Left arm, Patient Position: Chair, Cuff Size: Adult Regular)  Pulse 74  Temp 97.4  F (36.3  C) (Oral)  Wt 194 lb (88 kg)  LMP 12/14/2016 (Exact Date)  Breastfeeding? No  BMI 32.28 kg/m2 Estimated body mass index is 32.28 kg/(m^2) as calculated from the following:    Height as of 4/21/15: 5' 5\" (1.651 m).    Weight as of this encounter: 194 lb (88 kg).  Medication Reconciliation: complete   Guillermo Engel CMA      "

## 2017-06-21 NOTE — MR AVS SNAPSHOT
After Visit Summary   6/21/2017    Nayla Mmcanus    MRN: [id number]           Patient Information     Date Of Birth          1995        Visit Information        Provider Department      6/21/2017 8:00 AM Ankur Ruiz MD [facility]        Today's Diagnoses     Supervision of other normal pregnancy, antepartum        Need for rhogam due to Rh negative mother          Care Instructions                                                        If you have any questions regarding your visit, Please contact your care team.     [facility] Access Services: [phone]    [facility] CLINIC HOURS TELEPHONE NUMBER   Ankur Ruiz M.D.      [place]-    Jerald Ospina-MARGARET Li-Medical Assistant   Monday-[place]  8:00a.m-4:45 p.m  Wednesday-[place] 8:00a.m-4:45 p.m.  Thursday-[place]  8:00a.m-4:45 p.m.  Friday-[place]  8:00a.m-4:45 p.m. [facility]  [address] [place] [address]  [phone] ask for [facility]  [phone] Fax  Imaging [phone]    [facility] Labor and Delivery  [address] [place] [address] [place] [address]  [phone]    [facility]  [address] [place] [address]  [phone] ask for [facility]  [phone] Fax  Imaging [phone]     Urgent Care locations:    [facility] Monday-Friday  5 pm - 9 pm  Saturday and Sunday   9 am - 5 pm    Monday-Friday   11 am - 9 pm  Saturday and Sunday   9 am - 5 pm   [phone]       If you need a medication refill, please contact your pharmacy. Please allow 3 business days for your refill to be completed.  As always, Thank you for trusting us with your healthcare needs!            Follow-ups after your visit        Who to contact     If you have questions or need follow up information about today's clinic visit or your schedule please contact [place]  [facility] directly at [phone].  Normal or non-critical lab and imaging results will be communicated to you by Stilnesthart, letter or phone within 4 business days after the clinic has received the results. If you do not hear from us within 7 days, please contact the clinic through Stil[employer] or phone. If you have a critical or abnormal lab result, we will notify you by phone as soon as possible.  Submit refill requests through [employer] or call your pharmacy and they will forward the refill request to us. Please allow 3 business days for your refill to be completed.          Additional Information About Your Visit        [employer] Information     [employer] gives you secure access to your electronic health record. If you see a primary care provider, you can also send messages to your care team and make appointments. If you have questions, please call your primary care clinic.  If you do not have a primary care provider, please call [phone] and they will assist you.        Care EveryWhere ID     This is your Care EveryWhere ID. This could be used by other organizations to access your [place] medical records  [id number]        Your Vitals Were     Pulse Temperature Last Period Breastfeeding? BMI (Body Mass Index)       74 97.4  F (36.3  C) (Oral) 12/14/2016 (Exact Date) No 32.28 kg/m2        Blood Pressure from Last 3 Encounters:   06/21/17 120/74   05/18/17 124/74   04/13/17 123/73    Weight from Last 3 Encounters:   06/21/17 194 lb (88 kg)   05/18/17 189 lb (85.7 kg)   04/13/17 189 lb (85.7 kg)              Today, you had the following     No orders found for display         Today's Medication Changes          These changes are accurate as of: 6/21/17  8:00 AM.  If you have any questions, ask your nurse or doctor.               Stop taking these medicines if you haven't already. Please contact your care team if you have questions.     ketoconazole 2 % shampoo   Commonly known as:  NIZORAL   Stopped by:   Ankur Ruiz MD                    Primary Care Provider Office Phone # Fax #    Emily RODRÍGUEZ MD [place] [phone]       [address] [address]        Equal Access to Services     JAMIE KAY : Hadii aad ku hadarchanao Soomaali, waaxda luqadaha, qaybta kaalmada adeegyada, nestor joséin hayaan donna litojordan holland. So [facility] [phone].    ATENCIÓN: Si habla español, tiene a reardon disposición servicios gratuitos de asistencia lingüística. Llame al [phone].    We comply with applicable federal civil rights laws and [place] laws. We do not discriminate on the basis of race, color, national origin, age, disability sex, sexual orientation or gender identity.            Thank you!     Thank you for choosing [facility]  for your care. Our goal is always to provide you with excellent care. Hearing back from our patients is one way we can continue to improve our services. Please take a few minutes to complete the written survey that you may receive in the mail after your visit with us. Thank you!             Your Updated Medication List - Protect others around you: Learn how to safely use, store and throw away your medicines at www.disposemymeds.org.          This list is accurate as of: 6/21/17  8:00 AM.  Always use your most recent med list.                   Brand Name Dispense Instructions for use Diagnosis    prenatal multivitamin  plus iron 27-0.8 MG Tabs per tablet      Take 1 tablet by mouth daily

## 2017-07-13 ENCOUNTER — PRENATAL OFFICE VISIT (OUTPATIENT)
Dept: OBGYN | Facility: CLINIC | Age: 22
End: 2017-07-13
Payer: COMMERCIAL

## 2017-07-13 VITALS
SYSTOLIC BLOOD PRESSURE: 127 MMHG | DIASTOLIC BLOOD PRESSURE: 79 MMHG | HEART RATE: 82 BPM | WEIGHT: 196 LBS | BODY MASS INDEX: 32.62 KG/M2 | TEMPERATURE: 98.2 F

## 2017-07-13 DIAGNOSIS — Z29.13 NEED FOR RHOGAM DUE TO RH NEGATIVE MOTHER: ICD-10-CM

## 2017-07-13 DIAGNOSIS — Z34.80 SUPERVISION OF OTHER NORMAL PREGNANCY, ANTEPARTUM: Primary | ICD-10-CM

## 2017-07-13 LAB
ALBUMIN UR-MCNC: NEGATIVE MG/DL
APPEARANCE UR: CLEAR
BILIRUB UR QL STRIP: NEGATIVE
COLOR UR AUTO: YELLOW
GLUCOSE UR STRIP-MCNC: NEGATIVE MG/DL
HGB UR QL STRIP: NEGATIVE
KETONES UR STRIP-MCNC: NEGATIVE MG/DL
LEUKOCYTE ESTERASE UR QL STRIP: ABNORMAL
NITRATE UR QL: NEGATIVE
PH UR STRIP: 7 PH (ref 5–7)
SP GR UR STRIP: 1.01 (ref 1–1.03)
URN SPEC COLLECT METH UR: ABNORMAL
UROBILINOGEN UR STRIP-ACNC: 0.2 EU/DL (ref 0.2–1)

## 2017-07-13 PROCEDURE — 99207 ZZC PRENATAL VISIT: CPT | Performed by: OBSTETRICS & GYNECOLOGY

## 2017-07-13 PROCEDURE — 81003 URINALYSIS AUTO W/O SCOPE: CPT | Performed by: OBSTETRICS & GYNECOLOGY

## 2017-07-13 NOTE — NURSING NOTE
"Chief Complaint   Patient presents with     Prenatal Care     OBV 30w1d       Initial /79 (BP Location: Left arm, Patient Position: Chair, Cuff Size: Adult Regular)  Pulse 82  Temp 98.2  F (36.8  C) (Oral)  Wt 196 lb (88.9 kg)  LMP 12/14/2016 (Exact Date)  Breastfeeding? No  BMI 32.62 kg/m2 Estimated body mass index is 32.62 kg/(m^2) as calculated from the following:    Height as of 4/21/15: 5' 5\" (1.651 m).    Weight as of this encounter: 196 lb (88.9 kg).  Medication Reconciliation: complete   Guillermo Engel CMA      "

## 2017-07-13 NOTE — PATIENT INSTRUCTIONS
If you have any questions regarding your visit, Please contact your care team.     [employer] Access Services: [phone]    [facility] CLINIC HOURS TELEPHONE NUMBER   KAREN Dyer-    Jerald Ospina-MARGARET Li-Medical Assistant   Monday-[place]  8:00a.m-4:45 p.m  Wednesday-[place] 8:00a.m-4:45 p.m.  Thursday-[place]  8:00a.m-4:45 p.m.  Friday-[place]  8:00a.m-4:45 p.m. [facility]  [address]  [place], [place] [address] [phone] ask [facility]  [phone] Fax  Imaging [phone]    [facility] Labor and Delivery  [address] [place] [address] Dr.  [place], [place] [address] [phone]    [facility]  [address] [place] [address]  [phone] ask [facility]  [phone] Fax  Imaging [phone]     Urgent Care locations:    [place]        [place] Monday-Friday  5 pm - 9 pm  Saturday and Sunday   9 am - 5 pm    Monday-Friday   11 am - 9 pm  Saturday and Sunday   9 am - 5 pm   [phone]       If you need a medication refill, please contact your pharmacy. Please allow 3 business days for your refill to be completed.  As always, Thank you for trusting us with your healthcare needs!

## 2017-07-13 NOTE — MR AVS SNAPSHOT
After Visit Summary   7/13/2017    Nayla Mcmanus    MRN: [id number]           Patient Information     Date Of Birth          1995        Visit Information        Provider Department      7/13/2017 8:00 AM Ankur Ruiz MD [facility]        Today's Diagnoses     Supervision of other normal pregnancy, antepartum        Need for rhogam due to Rh negative mother          Care Instructions                                                        If you have any questions regarding your visit, Please contact your care team.     [facility] Access Services: [phone]    [facility] CLINIC HOURS TELEPHONE NUMBER   Ankur Ruiz M.D.      [place]-    Jerald Ospina-MARGARET Li-Medical Assistant   Monday-[place]  8:00a.m-4:45 p.m  Wednesday-[place] 8:00a.m-4:45 p.m.  Thursday-[place]  8:00a.m-4:45 p.m.  Friday-[place]  8:00a.m-4:45 p.m. [facility]  [address] [place], [place] [address] [place] [address]  [phone] ask for [facility]  [phone] Fax  Imaging [phone]    [facility] Labor and Delivery  [address] [place] [address] [place] [address]  [phone]    [facility]  [address] [place] [address]  [phone] ask for [facility]  [phone] Fax  Imaging [id number]-[phone]     Urgent Care locations:    [facility] Monday-Friday  5 pm - 9 pm  Saturday and Sunday   9 am - 5 pm    Monday-Friday   11 am - 9 pm  Saturday and Sunday   9 am - 5 pm   [phone]       If you need a medication refill, please contact your pharmacy. Please allow 3 business days for your refill to be completed.  As always, Thank you for trusting us with your healthcare needs!            Follow-ups after your visit        Who to contact     If you have questions or need follow up information about today's clinic visit or your schedule please contact [place]  [facility] directly at [phone].  Normal or non-critical lab and imaging results will be communicated to you by MyChart, letter or phone within 4 business days after the clinic has received the results. If you do not hear from us within 7 days, please contact the clinic through MyChart or phone. If you have a critical or abnormal lab result, we will notify you by phone as soon as possible.  Submit refill requests through [employer] or call your pharmacy and they will forward the refill request to us. Please allow 3 business days for your refill to be completed.          Additional Information About Your Visit        [employer] Information     [employer] gives you secure access to your electronic health record. If you see a primary care provider, you can also send messages to your care team and make appointments. If you have questions, please call your primary care clinic.  If you do not have a primary care provider, please call [phone] and they will assist you.        Care EveryWhere ID     This is your Care EveryWhere ID. This could be used by other organizations to access your [place] medical records  [id number]        Your Vitals Were     Pulse Temperature Last Period Breastfeeding? BMI (Body Mass Index)       82 98.2  F (36.8  C) (Oral) 12/14/2016 (Exact Date) No 32.62 kg/m2        Blood Pressure from Last 3 Encounters:   07/13/17 127/79   06/21/17 120/74   05/18/17 124/74    Weight from Last 3 Encounters:   07/13/17 196 lb (88.9 kg)   06/21/17 194 lb (88 kg)   05/18/17 189 lb (85.7 kg)              Today, you had the following     No orders found for display       Primary Care Provider Office Phone # Fax #    Emily Jarrett [phone]       [address] [address]        Equal Access to Services     JAMIE KAY : Shante Diez, waaxda luqadaha, qaybta kaalmada donnayakapil, nestor holland. So [facility]  [phone].    ATENCIÓN: Si ji briones, tiene a reardon disposición servicios gratuitos de asistencia lingüística. Maggie al [phone].    We comply with applicable federal civil rights laws and [place] laws. We do not discriminate on the basis of race, color, national origin, age, disability sex, sexual orientation or gender identity.            Thank you!     Thank you for choosing [facility]  for your care. Our goal is always to provide you with excellent care. Hearing back from our patients is one way we can continue to improve our services. Please take a few minutes to complete the written survey that you may receive in the mail after your visit with us. Thank you!             Your Updated Medication List - Protect others around you: Learn how to safely use, store and throw away your medicines at www.disposemymeds.org.          This list is accurate as of: 7/13/17  8:06 AM.  Always use your most recent med list.                   Brand Name Dispense Instructions for use Diagnosis    prenatal multivitamin  plus iron 27-0.8 MG Tabs per tablet      Take 1 tablet by mouth daily

## 2017-07-13 NOTE — PROGRESS NOTES
No signif signs, symptoms or concerns except was not able to tolerate or try repeating the 1h GTT and will follow UA and spot check glucose as indicated at OB visits. Advice appropriate to gestational age reviewed including pertinent Checklist items. Discussed condition, tests and care plan including RBA. Problem list updated. Considering Tdap next.  A/P:  Nayla was seen today for prenatal care.    Diagnoses and all orders for this visit:    Supervision of other normal pregnancy, antepartum  -     UA without Microscopic    Need for rhogam due to Rh negative mother        Ankur Ruiz MD

## 2017-07-26 ENCOUNTER — PRENATAL OFFICE VISIT (OUTPATIENT)
Dept: OBGYN | Facility: CLINIC | Age: 22
End: 2017-07-26
Payer: COMMERCIAL

## 2017-07-26 VITALS
DIASTOLIC BLOOD PRESSURE: 72 MMHG | HEART RATE: 100 BPM | BODY MASS INDEX: 33.29 KG/M2 | WEIGHT: 199.8 LBS | OXYGEN SATURATION: 98 % | SYSTOLIC BLOOD PRESSURE: 120 MMHG | TEMPERATURE: 98.7 F | HEIGHT: 65 IN

## 2017-07-26 DIAGNOSIS — Z34.80 SUPERVISION OF OTHER NORMAL PREGNANCY, ANTEPARTUM: Primary | ICD-10-CM

## 2017-07-26 DIAGNOSIS — Z29.13 NEED FOR RHOGAM DUE TO RH NEGATIVE MOTHER: ICD-10-CM

## 2017-07-26 LAB
ALBUMIN UR-MCNC: NEGATIVE MG/DL
APPEARANCE UR: CLEAR
BILIRUB UR QL STRIP: NEGATIVE
COLOR UR AUTO: YELLOW
GLUCOSE UR STRIP-MCNC: NEGATIVE MG/DL
HGB UR QL STRIP: NEGATIVE
KETONES UR STRIP-MCNC: NEGATIVE MG/DL
LEUKOCYTE ESTERASE UR QL STRIP: NEGATIVE
NITRATE UR QL: NEGATIVE
PH UR STRIP: 7.5 PH (ref 5–7)
SP GR UR STRIP: 1.01 (ref 1–1.03)
URN SPEC COLLECT METH UR: ABNORMAL
UROBILINOGEN UR STRIP-ACNC: 0.2 EU/DL (ref 0.2–1)

## 2017-07-26 PROCEDURE — 81003 URINALYSIS AUTO W/O SCOPE: CPT | Performed by: OBSTETRICS & GYNECOLOGY

## 2017-07-26 PROCEDURE — 99207 ZZC PRENATAL VISIT: CPT | Performed by: OBSTETRICS & GYNECOLOGY

## 2017-07-26 ASSESSMENT — PAIN SCALES - GENERAL: PAINLEVEL: NO PAIN (0)

## 2017-07-26 NOTE — PROGRESS NOTES
No signif signs, symptoms or concerns. Tdap declined. Advice appropriate to gestational age reviewed including pertinent Checklist items. Discussed condition, tests and care plan including RBA. Problem list updated. UA next.  A/P:  Nayla was seen today for prenatal care.    Diagnoses and all orders for this visit:    Supervision of other normal pregnancy, antepartum  -     *UA reflex to Microscopic    Need for rhogam due to Rh negative mother        Ankur Ruiz MD

## 2017-07-26 NOTE — MR AVS SNAPSHOT
After Visit Summary   7/26/2017    Nayla Mcmanus    MRN: [id number]           Patient Information     Date Of Birth          1995        Visit Information        Provider Department      7/26/2017 9:45 AM Ankur Ruiz MD [facility]        Today's Diagnoses     Supervision of other normal pregnancy, antepartum        Need for rhogam due to Rh negative mother          Care Instructions                                                        If you have any questions regarding your visit, Please contact your care team.     [facility] Access Services: [phone]    [facility] CLINIC HOURS TELEPHONE NUMBER   Ankur Ruiz M.D.      Courtney-    Jerald Ospina-MARGARET Li-Medical Assistant   Monday-[place]  8:00a.m-4:45 p.m  Wednesday-[place] 8:00a.m-4:45 p.m.  Thursday-[place]  8:00a.m-4:45 p.m.  Friday-[place]  8:00a.m-4:45 p.m. [facility]  [address] [place], [place] [address] [address]  [phone] ask for [facility]  [phone] Fax  Imaging [phone]    [facility] Labor and Delivery  [address] [place] [address] [place] [address]  [phone]    [facility]  [address] [place] [address]  [phone] ask for [facility]  [phone] Fax  Imaging [phone]     Urgent Care locations:    [facility] Monday-Friday  5 pm - 9 pm  Saturday and Sunday   9 am - 5 pm    Monday-Friday   11 am - 9 pm  Saturday and Sunday   9 am - 5 pm   [phone]       If you need a medication refill, please contact your pharmacy. Please allow 3 business days for your refill to be completed.  As always, Thank you for trusting us with your healthcare needs!            Follow-ups after your visit        Who to contact     If you have questions or need follow up information about today's clinic visit or your schedule please contact [facility] directly at [phone].  Normal or non-critical lab and imaging results will be communicated to you by MyChart, letter or phone within 4 business days after the clinic has received the results. If you do not hear from us within 7 days, please contact the clinic through MyChart or phone. If you have a critical or abnormal lab result, we will notify you by phone as soon as possible.  Submit refill requests through [employer] or call your pharmacy and they will forward the refill request to us. Please allow 3 business days for your refill to be completed.          Additional Information About Your Visit        [employer] Information     [employer] gives you secure access to your electronic health record. If you see a primary care provider, you can also send messages to your care team and make appointments. If you have questions, please call your primary care clinic.  If you do not have a primary care provider, please call [phone] and they will assist you.        Care EveryWhere ID     This is your Care EveryWhere ID. This could be used by other organizations to access your [facility] medical records  [id number]        Your Vitals Were     Pulse Temperature Height Last Period Pulse Oximetry BMI (Body Mass Index)    100 98.7  F (37.1  C) (Oral) 5' 5\" (1.651 m) 12/14/2016 (Exact Date) 98% 33.25 kg/m2       Blood Pressure from Last 3 Encounters:   07/26/17 120/72   07/13/17 127/79   06/21/17 120/74    Weight from Last 3 Encounters:   07/26/17 199 lb 12.8 oz (90.6 kg)   07/13/17 196 lb (88.9 kg)   06/21/17 194 lb (88 kg)              Today, you had the following     No orders found for display       Primary Care Provider Office Phone # Fax #    Emily Jarrett [phone]       [address] [address]        Equal Access to Services     JAMIE KAY AH: Hadii francisco Diez, walanda luqadaha, qaybta kaalmakapil tucker, nestor pool " donna shelldorothyjordan terrazasAlexboo ah. So [facility] [phone].    ATENCIÓN: Si habla anali, tiene a reardon disposición servicios gratuitos de asistencia lingüística. Maggie al [phone].    We comply with applicable federal civil rights laws and [place] laws. We do not discriminate on the basis of race, color, national origin, age, disability sex, sexual orientation or gender identity.            Thank you!     Thank you for choosing [facility]  for your care. Our goal is always to provide you with excellent care. Hearing back from our patients is one way we can continue to improve our services. Please take a few minutes to complete the written survey that you may receive in the mail after your visit with us. Thank you!             Your Updated Medication List - Protect others around you: Learn how to safely use, store and throw away your medicines at www.disposemymeds.org.          This list is accurate as of: 7/26/17  9:55 AM.  Always use your most recent med list.                   Brand Name Dispense Instructions for use Diagnosis    prenatal multivitamin  plus iron 27-0.8 MG Tabs per tablet      Take 1 tablet by mouth daily

## 2017-07-26 NOTE — NURSING NOTE
"Chief Complaint   Patient presents with     Prenatal Care     32 weeks       Initial /72 (BP Location: Left arm, Patient Position: Chair, Cuff Size: Adult Regular)  Pulse 100  Temp 98.7  F (37.1  C) (Oral)  Ht 5' 5\" (1.651 m)  Wt 199 lb 12.8 oz (90.6 kg)  LMP 12/14/2016 (Exact Date)  SpO2 98%  BMI 33.25 kg/m2 Estimated body mass index is 33.25 kg/(m^2) as calculated from the following:    Height as of this encounter: 5' 5\" (1.651 m).    Weight as of this encounter: 199 lb 12.8 oz (90.6 kg).  Medication Reconciliation: complete     Kenia Jordan MA      "

## 2017-07-26 NOTE — PATIENT INSTRUCTIONS
If you have any questions regarding your visit, Please contact your care team.     [facility] Access Services: [phone]    [facility] CLINIC HOURS TELEPHONE NUMBER   KAREN Dyer-    Jerald Ospina-MARGARET Li-Medical Assistant   Monday-[place]  8:00a.m-4:45 p.m  Wednesday-[place] 8:00a.m-4:45 p.m.  Thursday-[place]  8:00a.m-4:45 p.m.  Friday-[place]  8:00a.m-4:45 p.m. [facility]  [address]  [place], [place] [address] [phone] ask [facility]  [phone] Fax  Imaging [phone]    [facility] Labor and Delivery  [address] [place] [address] Dr.  [place], [place] [address] [phone]    [facility]  [address] [place] [address]  [phone] ask [facility]  [phone] Fax  Imaging [phone]     Urgent Care locations:    [place]        [place] Monday-Friday  5 pm - 9 pm  Saturday and Sunday   9 am - 5 pm    Monday-Friday   11 am - 9 pm  Saturday and Sunday   9 am - 5 pm   [phone]       If you need a medication refill, please contact your pharmacy. Please allow 3 business days for your refill to be completed.  As always, Thank you for trusting us with your healthcare needs!

## 2017-08-09 ENCOUNTER — PRENATAL OFFICE VISIT (OUTPATIENT)
Dept: OBGYN | Facility: CLINIC | Age: 22
End: 2017-08-09
Payer: COMMERCIAL

## 2017-08-09 VITALS
WEIGHT: 200 LBS | DIASTOLIC BLOOD PRESSURE: 72 MMHG | SYSTOLIC BLOOD PRESSURE: 113 MMHG | TEMPERATURE: 97.8 F | BODY MASS INDEX: 33.28 KG/M2 | HEART RATE: 75 BPM

## 2017-08-09 DIAGNOSIS — Z29.13 NEED FOR RHOGAM DUE TO RH NEGATIVE MOTHER: ICD-10-CM

## 2017-08-09 DIAGNOSIS — Z34.80 SUPERVISION OF OTHER NORMAL PREGNANCY, ANTEPARTUM: Primary | ICD-10-CM

## 2017-08-09 PROCEDURE — 81003 URINALYSIS AUTO W/O SCOPE: CPT | Performed by: OBSTETRICS & GYNECOLOGY

## 2017-08-09 PROCEDURE — 99207 ZZC PRENATAL VISIT: CPT | Performed by: OBSTETRICS & GYNECOLOGY

## 2017-08-09 NOTE — PROGRESS NOTES
No signif signs, symptoms or concerns. Advice appropriate to gestational age reviewed including pertinent Checklist items. Discussed condition, tests and care plan including RBA. Problem list updated. GBS and UA next.  A/P:  Nayla was seen today for prenatal care.    Diagnoses and all orders for this visit:    Supervision of other normal pregnancy, antepartum  -     *UA reflex to Microscopic    Need for rhogam due to Rh negative mother        Ankur Ruiz MD

## 2017-08-09 NOTE — NURSING NOTE
"Chief Complaint   Patient presents with     Prenatal Care     OBV 34 weeks       Initial /72 (BP Location: Right arm, Patient Position: Chair, Cuff Size: Adult Regular)  Pulse 75  Temp 97.8  F (36.6  C) (Oral)  Wt 200 lb (90.7 kg)  LMP 12/14/2016 (Exact Date)  Breastfeeding? No  BMI 33.28 kg/m2 Estimated body mass index is 33.28 kg/(m^2) as calculated from the following:    Height as of 7/26/17: 5' 5\" (1.651 m).    Weight as of this encounter: 200 lb (90.7 kg).  Medication Reconciliation: complete   Guillermo Engel CMA      "

## 2017-08-09 NOTE — MR AVS SNAPSHOT
After Visit Summary   8/9/2017    Nayla Mcmanus    MRN: [id number]           Patient Information     Date Of Birth          1995        Visit Information        Provider Department      8/9/2017 8:15 AM Ankur Ruiz MD [facility]        Today's Diagnoses     Supervision of other normal pregnancy, antepartum        Need for rhogam due to Rh negative mother          Care Instructions                                                        If you have any questions regarding your visit, Please contact your care team.     [facility] Access Services: [phone]    [facility] CLINIC HOURS TELEPHONE NUMBER   Ankur Ruiz M.D.      Courtney-    Jerald Ospina-RN    [place]-Medical Assistant   Monday-[place]  8:00a.m-4:45 p.m  Wednesday-[place] 8:00a.m-4:45 p.m.  Thursday-[place]  8:00a.m-4:45 p.m.  Friday-[place]  8:00a.m-4:45 p.m. [facility]  [address] [place] [address]  [phone] ask [facility]  [phone] Fax  Imaging [phone]    [facility] Labor and Delivery  [address] [place] [address] [place] [address]  [phone]    [facility]  [address] [place] [address]  [phone] ask [facility]  [phone] Fax  Imaging [phone]     Urgent Care locations:    [facility] Monday-Friday  5 pm - 9 pm  Saturday and Sunday   9 am - 5 pm    Monday-Friday   11 am - 9 pm  Saturday and Sunday   9 am - 5 pm   [phone]       If you need a medication refill, please contact your pharmacy. Please allow 3 business days for your refill to be completed.  As always, Thank you for trusting us with your healthcare needs!            Follow-ups after your visit        Your next 10 appointments already scheduled     Aug 09, 2017  8:15 AM CDT   ESTABLISHED PRENATAL with Ankur Ruiz MD   [facility]  [place] ([facility])    [address] [place] [id number] [phone]              Who to contact     If you have questions or need follow up information about today's clinic visit or your schedule please contact [facility] directly at [phone].  Normal or non-critical lab and imaging results will be communicated to you by MyChart, letter or phone within 4 business days after the clinic has received the results. If you do not hear from us within 7 days, please contact the clinic through [employer] or phone. If you have a critical or abnormal lab result, we will notify you by phone as soon as possible.  Submit refill requests through [employer] or call your pharmacy and they will forward the refill request to us. Please allow 3 business days for your refill to be completed.          Additional Information About Your Visit        [employer] Information     [employer] gives you secure access to your electronic health record. If you see a primary care provider, you can also send messages to your care team and make appointments. If you have questions, please call your primary care clinic.  If you do not have a primary care provider, please call [phone] and they will assist you.        Care EveryWhere ID     This is your Care EveryWhere ID. This could be used by other organizations to access your [facility] medical records  [id number]        Your Vitals Were     Pulse Temperature Last Period Breastfeeding? BMI (Body Mass Index)       75 97.8  F (36.6  C) (Oral) 12/14/2016 (Exact Date) No 33.28 kg/m2        Blood Pressure from Last 3 Encounters:   08/09/17 113/72   07/26/17 120/72   07/13/17 127/79    Weight from Last 3 Encounters:   08/09/17 200 lb (90.7 kg)   07/26/17 199 lb 12.8 oz (90.6 kg)   07/13/17 196 lb (88.9 kg)              Today, you had the following     No orders found for display       Primary Care Provider Office Phone # Fax #    Emily Jarrett  [phone] [address]  [facility] [address]        Equal Access to Services     JAMIE KAY : Hadii francisco Diez, katia hager, isi harpmakapil thompsonraynakapil, nestor kumarin hayaabi davidsonjennifer sumayadorothyjordan holland. So [facility] [phone].    ATENCIÓN: Si habla español, tiene a reardon disposición servicios gratuitos de asistencia lingüística. Llame al [phone].    We comply with applicable federal civil rights laws and [place] laws. We do not discriminate on the basis of race, color, national origin, age, disability sex, sexual orientation or gender identity.            Thank you!     Thank you for choosing [facility]  for your care. Our goal is always to provide you with excellent care. Hearing back from our patients is one way we can continue to improve our services. Please take a few minutes to complete the written survey that you may receive in the mail after your visit with us. Thank you!             Your Updated Medication List - Protect others around you: Learn how to safely use, store and throw away your medicines at www.disposemymeds.org.          This list is accurate as of: 8/9/17  8:14 AM.  Always use your most recent med list.                   Brand Name Dispense Instructions for use Diagnosis    prenatal multivitamin plus iron 27-0.8 MG Tabs per tablet      Take 1 tablet by mouth daily

## 2017-08-09 NOTE — PATIENT INSTRUCTIONS
If you have any questions regarding your visit, Please contact your care team.     [employer] Access Services: [phone]    [facility] CLINIC HOURS TELEPHONE NUMBER   KAREN Dyer-    Jerald Ospina-MARGARET Li-Medical Assistant   Monday-[place]  8:00a.m-4:45 p.m  Wednesday-[place] 8:00a.m-4:45 p.m.  Thursday-[place]  8:00a.m-4:45 p.m.  Friday-[place]  8:00a.m-4:45 p.m. [facility]  [address]  [place], [place] [address] [phone] ask [facility]  [phone] Fax  Imaging [phone]    [facility] [place] [facility] Labor and Delivery  [address] [place] [address] Dr.  [place], [place] [address] [phone]    [facility]  [address] [place] [address]  [phone] ask [facility]  [phone] Fax  Imaging [phone]     Urgent Care locations:    [place]        [place] Monday-Friday  5 pm - 9 pm  Saturday and Sunday   9 am - 5 pm    Monday-Friday   11 am - 9 pm  Saturday and Sunday   9 am - 5 pm   [phone]       If you need a medication refill, please contact your pharmacy. Please allow 3 business days for your refill to be completed.  As always, Thank you for trusting us with your healthcare needs!

## 2017-08-27 ENCOUNTER — NURSE TRIAGE (OUTPATIENT)
Dept: NURSING | Facility: CLINIC | Age: 22
End: 2017-08-27

## 2017-08-27 NOTE — TELEPHONE ENCOUNTER
37 weeks pregnant today, healthy first pregnancy.  Hasn't felt kicks all day long, which is a big change from normal.  Is lying on left side, has felt 2-3 movements in past 30 minutes but remains very concerned.  Can hear heartbeat with home doppler.  Has drank cold fluids to stimulate baby, no other symptoms to report.  Concerned cord may be around neck of fetus.  Paged on call provider, Dr. Majano to [phone] at 12:05 pm.      Reason for Disposition    [1] Pregnant 23 or more weeks AND [2] baby moving less today by kick count  (e.g., kick count < 5 in 1 hour or < 10 in 2 hours)    Protocols used: PREGNANCY - DECREASED FETAL MOVEMENT-ADULT-AH

## 2017-08-30 ENCOUNTER — PRENATAL OFFICE VISIT (OUTPATIENT)
Dept: OBGYN | Facility: CLINIC | Age: 22
End: 2017-08-30
Payer: COMMERCIAL

## 2017-08-30 VITALS
BODY MASS INDEX: 33.45 KG/M2 | TEMPERATURE: 98.7 F | SYSTOLIC BLOOD PRESSURE: 121 MMHG | WEIGHT: 201 LBS | HEART RATE: 96 BPM | DIASTOLIC BLOOD PRESSURE: 76 MMHG

## 2017-08-30 DIAGNOSIS — Z29.13 NEED FOR RHOGAM DUE TO RH NEGATIVE MOTHER: ICD-10-CM

## 2017-08-30 DIAGNOSIS — Z34.80 SUPERVISION OF OTHER NORMAL PREGNANCY, ANTEPARTUM: Primary | ICD-10-CM

## 2017-08-30 LAB
ALBUMIN UR-MCNC: NEGATIVE MG/DL
AMORPH CRY #/AREA URNS HPF: ABNORMAL /HPF
APPEARANCE UR: CLEAR
BACTERIA #/AREA URNS HPF: ABNORMAL /HPF
BILIRUB UR QL STRIP: NEGATIVE
COLOR UR AUTO: YELLOW
GLUCOSE UR STRIP-MCNC: NEGATIVE MG/DL
HGB UR QL STRIP: NEGATIVE
KETONES UR STRIP-MCNC: NEGATIVE MG/DL
LEUKOCYTE ESTERASE UR QL STRIP: ABNORMAL
MUCOUS THREADS #/AREA URNS LPF: PRESENT /LPF
NITRATE UR QL: NEGATIVE
NON-SQ EPI CELLS #/AREA URNS LPF: ABNORMAL /LPF
PH UR STRIP: 6.5 PH (ref 5–7)
RBC #/AREA URNS AUTO: ABNORMAL /HPF
SOURCE: ABNORMAL
SP GR UR STRIP: 1.01 (ref 1–1.03)
UROBILINOGEN UR STRIP-ACNC: 0.2 EU/DL (ref 0.2–1)
WBC #/AREA URNS AUTO: ABNORMAL /HPF

## 2017-08-30 PROCEDURE — 87653 STREP B DNA AMP PROBE: CPT | Performed by: OBSTETRICS & GYNECOLOGY

## 2017-08-30 PROCEDURE — 99207 ZZC PRENATAL VISIT: CPT | Performed by: OBSTETRICS & GYNECOLOGY

## 2017-08-30 PROCEDURE — 81001 URINALYSIS AUTO W/SCOPE: CPT | Performed by: OBSTETRICS & GYNECOLOGY

## 2017-08-30 NOTE — PATIENT INSTRUCTIONS
If you have any questions regarding your visit, Please contact your care team.     [employer] Access Services: [phone]    [facility] CLINIC HOURS TELEPHONE NUMBER   KAREN Dyer-    Jerald Ospina-MARGARET Li-Medical Assistant   Monday-[place]  8:00a.m-4:45 p.m  Wednesday-[place] 8:00a.m-4:45 p.m.  Thursday-[place]  8:00a.m-4:45 p.m.  Friday-[place]  8:00a.m-4:45 p.m. [facility]  [address]  [place], [place] [address] [phone] ask [employer]  [phone] Fax  Imaging [phone]    [facility] Labor and Delivery  [address] [place] [address] Dr.  [place], [place] [address] [phone]    [facility]  [address] [place] [address]  [phone] ask [employer]  [phone] Fax  Imaging [phone]     Urgent Care locations:    [place]        [place] Monday-Friday  5 pm - 9 pm  Saturday and Sunday   9 am - 5 pm    Monday-Friday   11 am - 9 pm  Saturday and Sunday   9 am - 5 pm   [phone]       If you need a medication refill, please contact your pharmacy. Please allow 3 business days for your refill to be completed.  As always, Thank you for trusting us with your healthcare needs!

## 2017-08-30 NOTE — PROGRESS NOTES
No signif signs, symptoms or concerns except had Labor and Delivery evaluation 4 days ago for decreased FM and was fine and now active FM again- observe. Advice appropriate to gestational age reviewed including pertinent Checklist items. Discussed condition, tests and care plan including RBA. Problem list updated.   A/P:  Nayla was seen today for prenatal care.    Diagnoses and all orders for this visit:    Supervision of other normal pregnancy, antepartum  -     Group B strep PCR  -     UA without Microscopic    Need for rhogam due to Rh negative mother        Ankur Ruiz MD

## 2017-08-30 NOTE — NURSING NOTE
"Chief Complaint   Patient presents with     Prenatal Care     OBV 37 weeks       Initial /76 (BP Location: Left arm, Patient Position: Chair, Cuff Size: Adult Regular)  Pulse 96  Temp 98.7  F (37.1  C) (Oral)  Wt 201 lb (91.2 kg)  LMP 12/14/2016 (Exact Date)  Breastfeeding? No  BMI 33.45 kg/m2 Estimated body mass index is 33.45 kg/(m^2) as calculated from the following:    Height as of 7/26/17: 5' 5\" (1.651 m).    Weight as of this encounter: 201 lb (91.2 kg).  Medication Reconciliation: complete   Guillermo Engel CMA      "

## 2017-08-30 NOTE — MR AVS SNAPSHOT
After Visit Summary   8/30/2017    Nayla Mcmanus    MRN: [id number]           Patient Information     Date Of Birth          1995        Visit Information        Provider Department      8/30/2017 8:00 AM Ankur Ruiz MD [facility]        Today's Diagnoses     Supervision of other normal pregnancy, antepartum        Need for rhogam due to Rh negative mother          Care Instructions                                                        If you have any questions regarding your visit, Please contact your care team.     [facility] Access Services: [phone]    [facility] CLINIC HOURS TELEPHONE NUMBER   Ankur Ruiz M.D.      Courtney-    Jerald Ospina-MARGARET Li-Medical Assistant   Monday-[place]  8:00a.m-4:45 p.m  Wednesday-[place] 8:00a.m-4:45 p.m.  Thursday-[place]  8:00a.m-4:45 p.m.  Friday-[place]  8:00a.m-4:45 p.m. [facility]  [address] [place] [address]  [phone] ask for [facility]  [phone] Fax  Imaging Ooplvzkwba-[phone]    [facility] Labor and Delivery  [address] [place] [address] [place] [address]  [phone]    [facility]  [address] [place] [address]  [phone] ask for [facility]  [phone] Fax  Imaging [phone]     Urgent Care locations:    [facility] Monday-Friday  5 pm - 9 pm  Saturday and Sunday   9 am - 5 pm    Monday-Friday   11 am - 9 pm  Saturday and Sunday   9 am - 5 pm   [phone]       If you need a medication refill, please contact your pharmacy. Please allow 3 business days for your refill to be completed.  As always, Thank you for trusting us with your healthcare needs!            Follow-ups after your visit        Who to contact     If you have questions or need follow up information about today's clinic visit or your schedule please contact [place]  [facility] directly at [phone].  Normal or non-critical lab and imaging results will be communicated to you by MyChart, letter or phone within 4 business days after the clinic has received the results. If you do not hear from us within 7 days, please contact the clinic through Shopnationhart or phone. If you have a critical or abnormal lab result, we will notify you by phone as soon as possible.  Submit refill requests through Oxlo Systems or call your pharmacy and they will forward the refill request to us. Please allow 3 business days for your refill to be completed.          Additional Information About Your Visit        ShopnationharLVL6 Information     [employer] Systems gives you secure access to your electronic health record. If you see a primary care provider, you can also send messages to your care team and make appointments. If you have questions, please call your primary care clinic.  If you do not have a primary care provider, please call [phone] and they will assist you.        Care EveryWhere ID     This is your Care EveryWhere ID. This could be used by other organizations to access your [place] medical records  [id number]        Your Vitals Were     Pulse Temperature Last Period Breastfeeding? BMI (Body Mass Index)       96 98.7  F (37.1  C) (Oral) 12/14/2016 (Exact Date) No 33.45 kg/m2        Blood Pressure from Last 3 Encounters:   08/30/17 121/76   08/09/17 113/72   07/26/17 120/72    Weight from Last 3 Encounters:   08/30/17 201 lb (91.2 kg)   08/09/17 200 lb (90.7 kg)   07/26/17 199 lb 12.8 oz (90.6 kg)              Today, you had the following     No orders found for display       Primary Care Provider Office Phone # Fax #    Emily Jarrett [phone] [address] [address]        Equal Access to Services     HERACLIO KAY : Shante Diez, waelise luqadaha, qaybta kaalmada garrett, nestor holland. So [facility]  [phone].    ATENCIÓN: Si ji briones, tiene a reardon disposición servicios gratuitos de asistencia lingüística. Maggie al [phone].    We comply with applicable federal civil rights laws and [place] laws. We do not discriminate on the basis of race, color, national origin, age, disability sex, sexual orientation or gender identity.            Thank you!     Thank you for choosing [facility]  for your care. Our goal is always to provide you with excellent care. Hearing back from our patients is one way we can continue to improve our services. Please take a few minutes to complete the written survey that you may receive in the mail after your visit with us. Thank you!             Your Updated Medication List - Protect others around you: Learn how to safely use, store and throw away your medicines at www.disposemymeds.org.          This list is accurate as of: 8/30/17  8:03 AM.  Always use your most recent med list.                   Brand Name Dispense Instructions for use Diagnosis    prenatal multivitamin plus iron 27-0.8 MG Tabs per tablet      Take 1 tablet by mouth daily

## 2017-08-31 LAB
GP B STREP DNA SPEC QL NAA+PROBE: NEGATIVE
SPECIMEN SOURCE: NORMAL

## 2017-09-10 ENCOUNTER — HEALTH MAINTENANCE LETTER (OUTPATIENT)
Age: 22
End: 2017-09-10

## 2017-09-12 ENCOUNTER — PRENATAL OFFICE VISIT (OUTPATIENT)
Dept: OBGYN | Facility: CLINIC | Age: 22
End: 2017-09-12
Payer: COMMERCIAL

## 2017-09-12 VITALS
BODY MASS INDEX: 33.95 KG/M2 | DIASTOLIC BLOOD PRESSURE: 76 MMHG | SYSTOLIC BLOOD PRESSURE: 120 MMHG | HEART RATE: 87 BPM | WEIGHT: 204 LBS | TEMPERATURE: 98.2 F

## 2017-09-12 DIAGNOSIS — Z34.80 SUPERVISION OF OTHER NORMAL PREGNANCY, ANTEPARTUM: ICD-10-CM

## 2017-09-12 DIAGNOSIS — Z29.13 NEED FOR RHOGAM DUE TO RH NEGATIVE MOTHER: ICD-10-CM

## 2017-09-12 PROCEDURE — 99207 ZZC PRENATAL VISIT: CPT | Performed by: OBSTETRICS & GYNECOLOGY

## 2017-09-12 NOTE — NURSING NOTE
"Chief Complaint   Patient presents with     Prenatal Care     OBV 38w6d       Initial /76 (BP Location: Left arm, Patient Position: Chair, Cuff Size: Adult Regular)  Pulse 87  Temp 98.2  F (36.8  C) (Oral)  Wt 204 lb (92.5 kg)  LMP 12/14/2016 (Exact Date)  Breastfeeding? No  BMI 33.95 kg/m2 Estimated body mass index is 33.95 kg/(m^2) as calculated from the following:    Height as of 7/26/17: 5' 5\" (1.651 m).    Weight as of this encounter: 204 lb (92.5 kg).  Medication Reconciliation: complete   Guillermo Engel CMA      "

## 2017-09-12 NOTE — PATIENT INSTRUCTIONS
If you have any questions regarding your visit, Please contact your care team.     [facility] Access Services: [phone]    [facility] CLINIC HOURS TELEPHONE NUMBER   KAREN Dyer-    Jerald Ospina-MARGARET Li-Medical Assistant   Monday-[place]  8:00a.m-4:45 p.m  Wednesday-[place] 8:00a.m-4:45 p.m.  Thursday-[place]  8:00a.m-4:45 p.m.  Friday-[place]  8:00a.m-4:45 p.m. [facility]  [address]  [place], [place] [address] [phone] ask [facility] [place]  [phone] Fax  Imaging [id number]-[phone]    [facility] Labor and Delivery  [address] [place] [address] Dr.  [place], [place] [address] [phone]    [facility]  [address] [place] [address]  [phone] ask [facility] [place]  [phone] Fax  Imaging Tbanenkuky-[phone]     Urgent Care locations:    [place]        [place] Monday-Friday  5 pm - 9 pm  Saturday and Sunday   9 am - 5 pm    Monday-Friday   11 am - 9 pm  Saturday and Sunday   9 am - 5 pm   [phone]       If you need a medication refill, please contact your pharmacy. Please allow 3 business days for your refill to be completed.  As always, Thank you for trusting us with your healthcare needs!

## 2017-09-12 NOTE — MR AVS SNAPSHOT
After Visit Summary   9/12/2017    Nayla Mcmanus    MRN: [id number]           Patient Information     Date Of Birth          1995        Visit Information        Provider Department      9/12/2017 9:00 AM Ankur Ruiz MD [facility]        Today's Diagnoses     Supervision of other normal pregnancy, antepartum        Need for rhogam due to Rh negative mother          Care Instructions                                                        If you have any questions regarding your visit, Please contact your care team.     [facility] Access Services: [phone]    [facility] CLINIC HOURS TELEPHONE NUMBER   Ankur Ruiz M.D.      Courtney-    Jerald Ospina-MARGARET Li-Medical Assistant   Monday-[place]  8:00a.m-4:45 p.m  Wednesday-[place] 8:00a.m-4:45 p.m.  Thursday-[place]  8:00a.m-4:45 p.m.  Friday-[place]  8:00a.m-4:45 p.m. [facility]  [address] [place] [address]  [phone] ask for [facility]  [phone] Fax  Imaging [phone]    [facility] Labor and Delivery  [address] [place] [address] [place] [address]  [phone]    [facility]  [address] [place] [place] [address]  [phone] ask [facility]  [phone] Fax  Imaging [phone]     Urgent Care locations:    [facility] Monday-Friday  5 pm - 9 pm  Saturday and Sunday   9 am - 5 pm    Monday-Friday   11 am - 9 pm  Saturday and Sunday   9 am - 5 pm   [phone]       If you need a medication refill, please contact your pharmacy. Please allow 3 business days for your refill to be completed.  As always, Thank you for trusting us with your healthcare needs!            Follow-ups after your visit        Follow-up notes from your care team     Return in about 1 week (around 9/19/2017).      Your next 10 appointments already scheduled     Sep  12, 2017  9:00 AM CDT   [facility]ABL[facility]    [address] [facility] [address] [phone]            Sep 19, 2017  9:30 AM CDT   ESTABLISHED PRENATAL with Ankur Ruiz MD   [facility]    [address] [facility] [id number] [phone]              Who to contact     If you have questions or need follow up information about today's clinic visit or your schedule please contact [facility] directly at [phone].  Normal or non-critical lab and imaging results will be communicated to you by Nuhookhart, letter or phone within 4 business days after the clinic has received the results. If you do not hear from us within 7 days, please contact the clinic through Cueddt or phone. If you have a critical or abnormal lab result, we will notify you by phone as soon as possible.  Submit refill requests through ParQnow or call your pharmacy and they will forward the refill request to us. Please allow 3 business days for your refill to be completed.          Additional Information About Your Visit        ParQnow Information     ParQnow gives you secure access to your electronic health record. If you see a primary care provider, you can also send messages to your care team and make appointments. If you have questions, please call your primary care clinic.  If you do not have a primary care provider, please call [phone] and they will assist you.        Care EveryWhere ID     This is your Care EveryWhere ID. This could be used by other organizations to access your [place] medical records  [id number]        Your Vitals Were     Pulse Temperature Last Period Breastfeeding? BMI (Body Mass Index)       87 98.2  F (36.8  C) (Oral) 12/14/2016 (Exact Date) No 33.95 kg/m2        Blood Pressure from Last 3 Encounters:    09/12/17 120/76   08/30/17 121/76   08/09/17 113/72    Weight from Last 3 Encounters:   09/12/17 204 lb (92.5 kg)   08/30/17 201 lb (91.2 kg)   08/09/17 200 lb (90.7 kg)              Today, you had the following     No orders found for display       Primary Care Provider Office Phone # Fax #    Emily MARCOS Jarrett [phone]       [facility] [address]  [facility] [address]        Equal Access to Services     [facility]: Hadii aad ku hadasho Soomaali, waaxda luqadaha, qaybta kaalmada adeegyada, waxnancy zamora . So [facility] [phone].    ATENCIÓN: Si habla español, tiene a reardon disposición servicios gratuitos de asistencia lingüística. [facility] [phone].    We comply with applicable federal civil rights laws and [place] laws. We do not discriminate on the basis of race, color, national origin, age, disability sex, sexual orientation or gender identity.            Thank you!     Thank you for choosing [facility]  for your care. Our goal is always to provide you with excellent care. Hearing back from our patients is one way we can continue to improve our services. Please take a few minutes to complete the written survey that you may receive in the mail after your visit with us. Thank you!             Your Updated Medication List - Protect others around you: Learn how to safely use, store and throw away your medicines at www.disposemymeds.org.          This list is accurate as of: 9/12/17  8:56 AM.  Always use your most recent med list.                   Brand Name Dispense Instructions for use Diagnosis    prenatal multivitamin plus iron 27-0.8 MG Tabs per tablet      Take 1 tablet by mouth daily

## 2017-09-12 NOTE — PROGRESS NOTES
No signif signs, symptoms or concerns. Advice appropriate to gestational age reviewed including pertinent Checklist items. Discussed condition, tests and care plan including RBA. Problem list updated.   A/P:  Nayla was seen today for prenatal care.    Diagnoses and all orders for this visit:    Supervision of other normal pregnancy, antepartum    Need for rhogam due to Rh negative mother        Ankur Ruiz MD

## 2017-09-18 ENCOUNTER — TELEPHONE (OUTPATIENT)
Dept: OBGYN | Facility: CLINIC | Age: 22
End: 2017-09-18

## 2017-09-18 NOTE — TELEPHONE ENCOUNTER
"Phone call from patient. She stated this morning she noted a small \"gush\" of water that did not soak through her pants. She stated she put in a panty liner and has noticed continues fld on liner. Patient denied odor or color to discharge. No contractions or vaginal bleeding. Patient stated she is feeling some pressure \"but nothing bad.\" Patient is currently with her mom. Recommended she go to L & D to be evaluated. Explained will call Dr. Majano to tell her patient is on the way. pe will go home to shower first. Advised no tub bath. Patient agreed to follow advise. Anai Owens RN, BAN      "

## 2017-09-21 ENCOUNTER — PRENATAL OFFICE VISIT (OUTPATIENT)
Dept: OBGYN | Facility: CLINIC | Age: 22
End: 2017-09-21
Payer: COMMERCIAL

## 2017-09-21 VITALS
DIASTOLIC BLOOD PRESSURE: 83 MMHG | BODY MASS INDEX: 33.95 KG/M2 | WEIGHT: 204 LBS | TEMPERATURE: 98.9 F | SYSTOLIC BLOOD PRESSURE: 132 MMHG | HEART RATE: 89 BPM

## 2017-09-21 DIAGNOSIS — Z29.13 NEED FOR RHOGAM DUE TO RH NEGATIVE MOTHER: ICD-10-CM

## 2017-09-21 DIAGNOSIS — Z34.80 SUPERVISION OF OTHER NORMAL PREGNANCY, ANTEPARTUM: ICD-10-CM

## 2017-09-21 PROCEDURE — 99207 ZZC PRENATAL VISIT: CPT | Performed by: OBSTETRICS & GYNECOLOGY

## 2017-09-21 NOTE — PROGRESS NOTES
No signif signs, symptoms or concerns except overdue. Plan cervix ripening and induction at 41 weeks. Advice appropriate to gestational age reviewed including pertinent Checklist items. Discussed condition, tests and care plan including RBA. Problem list updated.   A/P:  Nayla was seen today for prenatal care.    Diagnoses and all orders for this visit:    Supervision of other normal pregnancy, antepartum    Need for rhogam due to Rh negative mother        Ankur Ruiz MD

## 2017-09-21 NOTE — Clinical Note
Patient is scheduled for cervical ripening on 9/26 at 7:30 pm and should call L&D ninety minutes beforehand to verify the scheduled time that day. Please notify patient.

## 2017-09-21 NOTE — PATIENT INSTRUCTIONS
If you have any questions regarding your visit, Please contact your care team.     [facility] Access Services: [phone]    [facility] CLINIC HOURS TELEPHONE NUMBER   KAREN Dyer-    Jerald Ospina-MARGARET Li-Medical Assistant   Monday-[place]  8:00a.m-4:45 p.m  Wednesday-[place] 8:00a.m-4:45 p.m.  Thursday-[place]  8:00a.m-4:45 p.m.  Friday-[place]  8:00a.m-4:45 p.m. [facility]  [address]  [place], [place] [address] [phone] ask [facility]  [phone] Fax  Imaging [phone]    [facility] Labor and Delivery  [address] [place] [address] Dr.  [place], [place] [address] [phone]    [facility]  [address] [place] [address]  [phone] ask [facility]  [phone] Fax  Imaging [phone]     Urgent Care locations:    [place]        [place] Monday-Friday  5 pm - 9 pm  Saturday and Sunday   9 am - 5 pm    Monday-Friday   11 am - 9 pm  Saturday and Sunday   9 am - 5 pm   [phone]       If you need a medication refill, please contact your pharmacy. Please allow 3 business days for your refill to be completed.  As always, Thank you for trusting us with your healthcare needs!

## 2017-09-21 NOTE — NURSING NOTE
"Chief Complaint   Patient presents with     Prenatal Care     OBV 40w1d       Initial /83 (BP Location: Left arm, Patient Position: Chair, Cuff Size: Adult Regular)  Pulse 89  Temp 98.9  F (37.2  C) (Oral)  Wt 204 lb (92.5 kg)  LMP 12/14/2016 (Exact Date)  Breastfeeding? No  BMI 33.95 kg/m2 Estimated body mass index is 33.95 kg/(m^2) as calculated from the following:    Height as of 7/26/17: 5' 5\" (1.651 m).    Weight as of this encounter: 204 lb (92.5 kg).  Medication Reconciliation: complete   Guillermo Engel CMA      "

## 2017-09-21 NOTE — MR AVS SNAPSHOT
After Visit Summary   9/21/2017    Nayla Mcmanus    MRN: [id number]           Patient Information     Date Of Birth          1995        Visit Information        Provider Department      9/21/2017 10:15 AM Ankur Ruiz MD [facility]        Today's Diagnoses     Supervision of other normal pregnancy, antepartum        Need for rhogam due to Rh negative mother          Care Instructions                                                        If you have any questions regarding your visit, Please contact your care team.     [facility] Access Services: [phone]    [facility] CLINIC HOURS TELEPHONE NUMBER   Ankur Ruiz M.D.      Courtney-    Jerald Ospina-MARGARET Li-Medical Assistant   Monday-[place]  8:00a.m-4:45 p.m  Wednesday-[place] 8:00a.m-4:45 p.m.  Thursday-[place]  8:00a.m-4:45 p.m.  Friday-[place]  8:00a.m-4:45 p.m. [facility]  [address] [place] [address]  [phone] ask for [facility]  [phone] Fax  Imaging Otbuajfzqg-[phone]    [facility] Labor and Delivery  [address] [place] [address] [place] [address]  [phone]    [facility]  [address] [place] [address]  [phone] ask for [facility]  [phone] Fax  Imaging [phone]     Urgent Care locations:    [facility] Monday-Friday  5 pm - 9 pm  Saturday and Sunday   9 am - 5 pm    Monday-Friday   11 am - 9 pm  Saturday and Sunday   9 am - 5 pm   [phone]       If you need a medication refill, please contact your pharmacy. Please allow 3 business days for your refill to be completed.  As always, Thank you for trusting us with your healthcare needs!            Follow-ups after your visit        Who to contact     If you have questions or need follow up information about today's clinic visit or your schedule please contact [place]  CLIN[facility] directly at [phone].  Normal or non-critical lab and imaging results will be communicated to you by MyChart, letter or phone within 4 business days after the clinic has received the results. If you do not hear from us within 7 days, please contact the clinic through Doktorburada.comhart or phone. If you have a critical or abnormal lab result, we will notify you by phone as soon as possible.  Submit refill requests through Skillshare or call your pharmacy and they will forward the refill request to us. Please allow 3 business days for your refill to be completed.          Additional Information About Your Visit        Doktorburada.comharmysportgroup Information     Skillshare gives you secure access to your electronic health record. If you see a primary care provider, you can also send messages to your care team and make appointments. If you have questions, please call your primary care clinic.  If you do not have a primary care provider, please call [phone] and they will assist you.        Care EveryWhere ID     This is your Care EveryWhere ID. This could be used by other organizations to access your [place] medical records  [id number]        Your Vitals Were     Pulse Temperature Last Period Breastfeeding? BMI (Body Mass Index)       89 98.9  F (37.2  C) (Oral) 12/14/2016 (Exact Date) No 33.95 kg/m2        Blood Pressure from Last 3 Encounters:   09/21/17 132/83   09/12/17 120/76   08/30/17 121/76    Weight from Last 3 Encounters:   09/21/17 204 lb (92.5 kg)   09/12/17 204 lb (92.5 kg)   08/30/17 201 lb (91.2 kg)              Today, you had the following     No orders found for display       Primary Care Provider Office Phone # Fax #    Emily Jarrett [phone] [address] [address]        Equal Access to Services     JAMIE KAY : Shante Diez, waaxda luqadaha, qaybta kaalmada garrett, nestor holland. So [facility]  [phone].    ATENCIÓN: Si ji briones, tiene a reardon disposición servicios gratuitos de asistencia lingüística. Maggie al [phone].    We comply with applicable federal civil rights laws and [place] laws. We do not discriminate on the basis of race, color, national origin, age, disability sex, sexual orientation or gender identity.            Thank you!     Thank you for choosing [facility]  for your care. Our goal is always to provide you with excellent care. Hearing back from our patients is one way we can continue to improve our services. Please take a few minutes to complete the written survey that you may receive in the mail after your visit with us. Thank you!             Your Updated Medication List - Protect others around you: Learn how to safely use, store and throw away your medicines at www.disposemymeds.org.          This list is accurate as of: 9/21/17 10:16 AM.  Always use your most recent med list.                   Brand Name Dispense Instructions for use Diagnosis    prenatal multivitamin plus iron 27-0.8 MG Tabs per tablet      Take 1 tablet by mouth daily

## 2017-09-22 ENCOUNTER — TELEPHONE (OUTPATIENT)
Dept: OBGYN | Facility: CLINIC | Age: 22
End: 2017-09-22

## 2017-09-22 NOTE — TELEPHONE ENCOUNTER
Called and left a message to call the clinic back. Patient is scheduled for cervical ripening on 9/26 at 7:30 pm and should call L&D ninety minutes beforehand to verify the scheduled time that day. Please notify patient.

## 2017-11-08 ENCOUNTER — PRENATAL OFFICE VISIT (OUTPATIENT)
Dept: OBGYN | Facility: CLINIC | Age: 22
End: 2017-11-08
Payer: COMMERCIAL

## 2017-11-08 VITALS
TEMPERATURE: 98.3 F | BODY MASS INDEX: 31.78 KG/M2 | SYSTOLIC BLOOD PRESSURE: 134 MMHG | WEIGHT: 191 LBS | DIASTOLIC BLOOD PRESSURE: 83 MMHG | HEART RATE: 86 BPM

## 2017-11-08 PROCEDURE — 99207 ZZC POST PARTUM EXAM: CPT | Performed by: OBSTETRICS & GYNECOLOGY

## 2017-11-08 PROCEDURE — G0145 SCR C/V CYTO,THINLAYER,RESCR: HCPCS | Performed by: OBSTETRICS & GYNECOLOGY

## 2017-11-08 ASSESSMENT — PATIENT HEALTH QUESTIONNAIRE - PHQ9: SUM OF ALL RESPONSES TO PHQ QUESTIONS 1-9: 0

## 2017-11-08 NOTE — PATIENT INSTRUCTIONS
If you have any questions regarding your visit, Please contact your care team.     [facility] Access Services: [phone]    [facility] CLINIC HOURS TELEPHONE NUMBER   KAREN Dyer-    Jessica Ospina-MARGARET Li-Medical Assistant   Monday-[place]  8:00a.m-4:45 p.m  Wednesday-[place] 8:00a.m-4:45 p.m.  Thursday-[place]  8:00a.m-4:45 p.m.  Friday-[place]  8:00a.m-4:45 p.m. [facility]  [address] [place] [address]  [phone] ask [facility]  [phone] Fax  Imaging [id number]-[phone]    [facility] Labor and Delivery  [address] [place] [address] [place] [address]  [phone]    [facility]  [address] [place] [address]  [phone] [facility]  [phone] Fax  Imaging Rlzqyoksdj-[phone]     Urgent Care locations:    [place]        [place] Monday-Friday  5 pm - 9 pm  Saturday and Sunday   9 am - 5 pm    Monday-Friday   11 am - 9 pm  Saturday and Sunday   9 am - 5 pm   [phone]       If you need a medication refill, please contact your pharmacy. Please allow 3 business days for your refill to be completed.  As always, Thank you for trusting us with your healthcare needs!

## 2017-11-08 NOTE — NURSING NOTE
"Chief Complaint   Patient presents with     Post Partum Exam     DOD 9/28/17       Initial /83 (BP Location: Left arm, Patient Position: Chair, Cuff Size: Adult Regular)  Pulse 86  Temp 98.3  F (36.8  C) (Oral)  Wt 191 lb (86.6 kg)  LMP 12/14/2016 (Exact Date)  Breastfeeding? Yes  BMI 31.78 kg/m2 Estimated body mass index is 31.78 kg/(m^2) as calculated from the following:    Height as of 7/26/17: 5' 5\" (1.651 m).    Weight as of this encounter: 191 lb (86.6 kg).  Medication Reconciliation: complete   Guillermo Engel CMA      "

## 2017-11-08 NOTE — MR AVS SNAPSHOT
After Visit Summary   11/8/2017    Nayla Mcmanus    MRN: [id number]           Patient Information     Date Of Birth          1995        Visit Information        Provider Department      11/8/2017 9:00 AM Ankur Ruiz MD [facility]        Care Instructions                                                        If you have any questions regarding your visit, Please contact your care team.     [facility] Access Services: [phone]    [facility] CLINIC HOURS TELEPHONE NUMBER   Ankur Ruiz M.D.      Courtney-    Jessica Ospina-MARGARET Li-Medical Assistant   Monday-[place]  8:00a.m-4:45 p.m  Wednesday-[place] 8:00a.m-4:45 p.m.  Thursday-[place]  8:00a.m-4:45 p.m.  Friday-[place]  8:00a.m-4:45 p.m. [facility]  [address] [place] [address] [place] [address]  [phone] ask for [facility]  [phone] Fax  Imaging [phone]    [facility] Labor and Delivery  [address] [place] [address] Dr.  [place], [place] [address] [phone]    [facility]  [address] [place] [address]  [phone] ask [facility]  [phone] Fax  Imaging [phone]     Urgent Care locations:    [facility] Monday-Friday  5 pm - 9 pm  Saturday and Sunday   9 am - 5 pm    Monday-Friday   11 am - 9 pm  Saturday and Sunday   9 am - 5 pm   [phone]       If you need a medication refill, please contact your pharmacy. Please allow 3 business days for your refill to be completed.  As always, Thank you for trusting us with your healthcare needs!            Follow-ups after your visit        Who to contact     If you have questions or need follow up information about today's clinic visit or your schedule please contact [facility] directly at [phone].  Normal or non-critical lab and imaging results will be communicated to you by  MyChart, letter or phone within 4 business days after the clinic has received the results. If you do not hear from us within 7 days, please contact the clinic through vitaMedMDt or phone. If you have a critical or abnormal lab result, we will notify you by phone as soon as possible.  Submit refill requests through Gemfire or call your pharmacy and they will forward the refill request to us. Please allow 3 business days for your refill to be completed.          Additional Information About Your Visit        [employer] Information     Gemfire gives you secure access to your electronic health record. If you see a primary care provider, you can also send messages to your care team and make appointments. If you have questions, please call your primary care clinic.  If you do not have a primary care provider, please call [phone] and they will assist you.        Care EveryWhere ID     This is your Care EveryWhere ID. This could be used by other organizations to access your [place] medical records  [id number]        Your Vitals Were     Pulse Temperature Last Period Breastfeeding? BMI (Body Mass Index)       86 98.3  F (36.8  C) (Oral) 12/14/2016 (Exact Date) Yes 31.78 kg/m2        Blood Pressure from Last 3 Encounters:   11/08/17 134/83   09/21/17 132/83   09/12/17 120/76    Weight from Last 3 Encounters:   11/08/17 191 lb (86.6 kg)   09/21/17 204 lb (92.5 kg)   09/12/17 204 lb (92.5 kg)              Today, you had the following     No orders found for display       Primary Care Provider Office Phone # Fax #    Emily Jarrett [phone]       [facility] [address]  [facility] [address]        Equal Access to Services     [facility] HospitalANYA : Hadii francisco Diez, katia hager, nestor bennett. So [facility] [phone].    ATENCIÓN: Si habla español, tiene a reardon disposición servicios gratuitos de asistencia lingüística. Llame al [phone].    We  comply with applicable federal civil rights laws and [place] laws. We do not discriminate on the basis of race, color, national origin, age, disability, sex, sexual orientation, or gender identity.            Thank you!     Thank you for choosing [facility]  for your care. Our goal is always to provide you with excellent care. Hearing back from our patients is one way we can continue to improve our services. Please take a few minutes to complete the written survey that you may receive in the mail after your visit with us. Thank you!             Your Updated Medication List - Protect others around you: Learn how to safely use, store and throw away your medicines at www.disposemymeds.org.          This list is accurate as of: 11/8/17  9:04 AM.  Always use your most recent med list.                   Brand Name Dispense Instructions for use Diagnosis    prenatal multivitamin plus iron 27-0.8 MG Tabs per tablet      Take 1 tablet by mouth daily

## 2017-11-09 PROBLEM — Z29.13 NEED FOR RHOGAM DUE TO RH NEGATIVE MOTHER: Status: RESOLVED | Noted: 2017-02-16 | Resolved: 2017-11-09

## 2017-11-09 PROBLEM — Z34.80 SUPERVISION OF OTHER NORMAL PREGNANCY, ANTEPARTUM: Status: RESOLVED | Noted: 2017-02-16 | Resolved: 2017-11-09

## 2017-11-09 NOTE — PROGRESS NOTES
Nayla Mcmanus is a 21 year old year old G 1 P 1 who is here today for a postpartum exam.    HPI:      Doing well and without signif sx or concerns except had a vulvar hematoma after delivery and still sore at vulva. Currently breast feeding infant. Here today with infant daughter. Infant doing well. Contraceptive method planned is condoms. NSA since delivery. PP depression screening as noted. See PHQ-9. Score = 0.    Past medical, obstetrical, surgical, family and social history reviewed and as noted or updated in chart.     Allergies, meds and supplements are as noted or updated in chart.      ROS:     Systems reviewed include constitutional; breast;                 cardiac; respiratory; gastrointestinal; genitourinary;                                musculoskeletal; integumentary; psychological;                                hematologic/lymphatic and endocrine.                  These systems were negative for significant symptoms except                 for the following: none and see HPI.                                Exam:  VS as noted.                    Abd and Pelvis were                             normal or negative except for, or in particular noting, the following                pertinent findings: raw 2x1.5 cm irregular region of tender granulating tissue at right introitus.      Assessment: Postpartum exam    Plan and Recommendations: Symptoms, problems and concerns reviewed.  Discussed pregnancy, birth, future pregnancy plans, work plans and infant care issues.  Problem list updated and Pregnancy Episode closed. See orders. Advise more time to heal and include daily oatmeal or Epsom salts sitz baths. This may require vulvar excisional repair and revision if not improving. RTC in 4 weeks.    Nayla was seen today for post partum exam.    Diagnoses and all orders for this visit:    Routine postpartum follow-up  -     Pap imaged thin layer screen only - recommended age 21 - 24 years        Ankur Ruiz  MD

## 2017-11-14 LAB
COPATH REPORT: NORMAL
PAP: NORMAL

## 2017-12-07 ENCOUNTER — OFFICE VISIT (OUTPATIENT)
Dept: OBGYN | Facility: CLINIC | Age: 22
End: 2017-12-07
Payer: COMMERCIAL

## 2017-12-07 VITALS
WEIGHT: 196 LBS | BODY MASS INDEX: 32.62 KG/M2 | TEMPERATURE: 99.2 F | HEART RATE: 95 BPM | DIASTOLIC BLOOD PRESSURE: 82 MMHG | SYSTOLIC BLOOD PRESSURE: 142 MMHG

## 2017-12-07 DIAGNOSIS — L92.9 GRANULATION TISSUE AT OBSTETRICAL LACERATION SITE: Primary | ICD-10-CM

## 2017-12-07 PROCEDURE — 99213 OFFICE O/P EST LOW 20 MIN: CPT | Mod: 25 | Performed by: OBSTETRICS & GYNECOLOGY

## 2017-12-07 PROCEDURE — 17250 CHEM CAUT OF GRANLTJ TISSUE: CPT | Performed by: OBSTETRICS & GYNECOLOGY

## 2017-12-07 NOTE — MR AVS SNAPSHOT
After Visit Summary   12/7/2017    Nayla Mcmanus    MRN: [id number]           Patient Information     Date Of Birth          1995        Visit Information        Provider Department      12/7/2017 4:30 PM Ankur Ruiz MD [facility]        Care Instructions                                                        If you have any questions regarding your visit, Please contact your care team.     [facility] Access Services: [phone]    [facility] CLINIC HOURS TELEPHONE NUMBER   KAREN Dyer-    Jessica Ospina-MARGARET Li-Medical Assistant   Monday-[place]  8:00a.m-4:45 p.m  [facility]-4:45 p.m.  Thursday-[place]  8:00a.m-4:45 p.m.  [facility]  8:00a.m-4:45 p.m. [facility]  [address] [address] [place], [place] [address]  [place], [place] [address] [phone] ask [facility]  [phone] Fax  Imaging [phone]    [facility] Labor and Delivery  [address] [place] [address] Dr.  [place], [place] [address] [phone]    [facility]  [address] [place] [address]  [phone] [facility]  [phone] Fax  Imaging [phone]     Urgent Care locations:    [facility] Monday-Friday  5 pm - 9 pm  Saturday and Sunday   9 am - 5 pm    Monday-Friday   11 am - 9 pm  Saturday and Sunday   9 am - 5 pm   [phone]       If you need a medication refill, please contact your pharmacy. Please allow 3 business days for your refill to be completed.  As always, Thank you for trusting us with your healthcare needs!            Follow-ups after your visit        Your next 10 appointments already scheduled     Dec 07, 2017  4:30 PM CST   Office Visit with Ankur Ruiz MD   [facility] ([facility])    [address] [facility] [address]   [phone]           Bring  a current list of meds and any records pertaining to this visit. For Physicals, please bring immunization records and any forms needing to be filled out. Please arrive 10 minutes early to complete paperwork.              Who to contact     If you have questions or need follow up information about today's clinic visit or your schedule please contact [facility] directly at [phone].  Normal or non-critical lab and imaging results will be communicated to you by Dashbellhart, letter or phone within 4 business days after the clinic has received the results. If you do not hear from us within 7 days, please contact the clinic through [employer] or phone. If you have a critical or abnormal lab result, we will notify you by phone as soon as possible.  Submit refill requests through StyleHop or call your pharmacy and they will forward the refill request to us. Please allow 3 business days for your refill to be completed.          Additional Information About Your Visit        MyChart Information     StyleHop gives you secure access to your electronic health record. If you see a primary care provider, you can also send messages to your care team and make appointments. If you have questions, please call your primary care clinic.  If you do not have a primary care provider, please call [phone] and they will assist you.        Care EveryWhere ID     This is your Care EveryWhere ID. This could be used by other organizations to access your [place] medical records  [id number]        Your Vitals Were     Pulse Temperature Breastfeeding? BMI (Body Mass Index)          95 99.2  F (37.3  C) (Oral) Yes 32.62 kg/m2         Blood Pressure from Last 3 Encounters:   12/07/17 142/82   11/08/17 134/83   09/21/17 132/83    Weight from Last 3 Encounters:   12/07/17 196 lb (88.9 kg)   11/08/17 191 lb (86.6 kg)   09/21/17 204 lb (92.5 kg)              Today, you had the following     No orders found for display       Primary  Care Provider Office Phone # Fax #    Emily Jarrett [phone]       [facility] [address] [address]        Equal Access to Services     JAMIE KAY : Hadii aad ku hadarchanamarty Sopeng, waaxda luqadaha, qaybta kaalmada adecarmenda, nestor mcleodbi davidsonjennifer morsejordan holland. So [facility] [phone].    ATENCIÓN: Si habla español, tiene a reardon disposición servicios gratuitos de asistencia lingüística. Llame al [phone].    We comply with applicable federal civil rights laws and [place] laws. We do not discriminate on the basis of race, color, national origin, age, disability, sex, sexual orientation, or gender identity.            Thank you!     Thank you for choosing [facility]  for your care. Our goal is always to provide you with excellent care. Hearing back from our patients is one way we can continue to improve our services. Please take a few minutes to complete the written survey that you may receive in the mail after your visit with us. Thank you!             Your Updated Medication List - Protect others around you: Learn how to safely use, store and throw away your medicines at www.disposemymeds.org.          This list is accurate as of: 12/7/17  4:28 PM.  Always use your most recent med list.                   Brand Name Dispense Instructions for use Diagnosis    prenatal multivitamin plus iron 27-0.8 MG Tabs per tablet      Take 1 tablet by mouth daily

## 2017-12-07 NOTE — NURSING NOTE
"Chief Complaint   Patient presents with     RECHECK     Follow-up for vulvar hematoma       Initial /82 (BP Location: Left arm, Patient Position: Chair, Cuff Size: Adult Regular)  Pulse 95  Temp 99.2  F (37.3  C) (Oral)  Wt 196 lb (88.9 kg)  Breastfeeding? Yes  BMI 32.62 kg/m2 Estimated body mass index is 32.62 kg/(m^2) as calculated from the following:    Height as of 7/26/17: 5' 5\" (1.651 m).    Weight as of this encounter: 196 lb (88.9 kg).  Medication Reconciliation: complete   Guillermo Engel CMA      "

## 2017-12-07 NOTE — PATIENT INSTRUCTIONS
If you have any questions regarding your visit, Please contact your care team.     [facility] Access Services: [phone]    [facility] CLINIC HOURS TELEPHONE NUMBER   KAREN Dyer-    Jessica Ospina-MARGARET Li-Medical Assistant   Monday-[place]  8:00a.m-4:45 p.m  Wednesday-[place] 8:00a.m-4:45 p.m.  Thursday-[place]  8:00a.m-4:45 p.m.  Friday-[place]  8:00a.m-4:45 p.m. [facility]  [address] [place] [address]  [phone] ask [facility]  [phone] Fax  Imaging [phone]    [facility] Labor and Delivery  [address] [place] [address] [place] [address]  [phone]    [facility]  [address] [place] [address]  [phone] [facility]  [phone] Fax  Imaging [phone]     Urgent Care locations:    [place]        [place] Monday-Friday  5 pm - 9 pm  Saturday and Sunday   9 am - 5 pm    Monday-Friday   11 am - 9 pm  Saturday and Sunday   9 am - 5 pm   [phone]       If you need a medication refill, please contact your pharmacy. Please allow 3 business days for your refill to be completed.  As always, Thank you for trusting us with your healthcare needs!

## 2017-12-09 PROBLEM — L92.9 GRANULATION TISSUE AT OBSTETRICAL LACERATION SITE: Status: ACTIVE | Noted: 2017-12-09

## 2017-12-09 NOTE — PROGRESS NOTES
OB-GYN Problem-Oriented Visit or Consultation      Nayla Mcmanus is a 21 year old year old P 1 who presents with a chief complaint of recheck vulvar granulation tissue and healing.  Referred by self.  No LMP recorded. Patient is not currently having periods (Reason: Breast Feeding).    HPI:     Feels fine but still notes some stinging at the vulva. Uncertain if area involved is smaller. Trying sitz baths. Had a normal menses 3 weeks ago. Continues nursing. Here with daughter. NSA.    Past medical, obstetrical, surgical, family and social history reviewed and as noted or updated in chart.     Allergies, meds and supplements are as noted or updated in chart.      ROS:   Systems reviewed include:  constitutional, breast, gastrointestinal, genitourinary, integumentary, psychological, hematologic/lymphatic and endocrine.    These systems were negative for significant symptoms except for the following additional: none; see HPI.    EXAM:  VS as noted. /82 (BP Location: Left arm, Patient Position: Chair, Cuff Size: Adult Regular)  Pulse 95  Temp 99.2  F (37.3  C) (Oral)  Wt 196 lb (88.9 kg)  Breastfeeding? Yes  BMI 32.62 kg/m2  Constitutionally normal.     Pelvis were  normal or negative except for, or in particular noting, the following  pertinent findings: vagina is normal with some menses, there remains a shallow 3 x 1.5 cm section of raised granulation tissue at the right vaginal introitus, less tender now but about the same size. This was outlined to patient with use of a mirror. She had some concern on the perineum but that otherwise appears normal and well healed.       PROCEDURE: Discussed findings with patient and indications, risks, benefits and alternatives of silver nitrate cauterization of the granulation tissue. Patient understood and desired to proceed and this was done uneventfully.       Assessment:   Encounter Diagnoses   Name Primary?     Granulation tissue at obstetrical laceration site Yes      Plan and Recommendations: I reviewed the condition, causes, differential diagnosis, prognosis, evaluation and management considerations and options.  Questions answered and information given.  See orders.  This probably needs excision and revision under local to achieve primary healing but will see if progress is made with silver nitrate treatment. RTC 2-3 weeks.   Total encounter time (physician together with patient) = 25min. Direct counseling, education and care coordination time (physician together with patient) = 15min. with this additional separate time spent counseling on the evaluation and management of the patient's condition(s) beyond discussion of  the procedure itself including its risks, benefits and alternatives and beyond preliminary examination and performance of the procedure itself.      A/P:  Nayla was seen today for recheck.    Diagnoses and all orders for this visit:    Granulation tissue at obstetrical laceration site  -     CHEM CAUTERY GRANULATION TISSUE        Ankur Ruiz MD

## 2018-04-02 ENCOUNTER — TELEPHONE (OUTPATIENT)
Dept: FAMILY MEDICINE | Facility: CLINIC | Age: 23
End: 2018-04-02

## 2018-04-02 DIAGNOSIS — B35.4 DERMATOPHYTOSIS OF BODY: ICD-10-CM

## 2018-04-02 NOTE — TELEPHONE ENCOUNTER
..Reason for Call:  Other     Detailed comments: Patient said she was on KETOKONOZOLE but once she got pregnant she couldn't continue the script. Now the patient has delivered and would like a script for the medication.    Phone Number Patient can be reached at: Home number on file [phone] (home)    Best Time: anytime    Can we leave a detailed message on this number? YES    Call taken on 4/2/2018 at 3:41 PM by Angel Neely

## 2018-04-03 RX ORDER — KETOCONAZOLE 20 MG/ML
SHAMPOO TOPICAL
Qty: 120 ML | Refills: 1 | Status: SHIPPED | OUTPATIENT
Start: 2018-04-03 | End: 2019-04-24

## 2018-04-03 NOTE — TELEPHONE ENCOUNTER
Medication Detail      Disp Refills Start End CAMILA   ketoconazole (NIZORAL) 2 % shampoo (Discontinued) 120 mL 1 2/15/2017 6/21/2017 No   Sig: Apply to the affected area and wash off after 5 minutes.   Patient not taking: Reported on 5/18/2017          Will route to provider for review and orders.    Guillermo Engel, CMA

## 2018-04-04 ENCOUNTER — TELEPHONE (OUTPATIENT)
Dept: FAMILY MEDICINE | Facility: CLINIC | Age: 23
End: 2018-04-04

## 2018-04-04 NOTE — TELEPHONE ENCOUNTER
Reason for Call: Medication question     Detailed comments: call [facility] to discuss RX ketoconazole (NIZORAL) 2 % shampoo    Phone Number Pharmacy can be reached at: [phone]    Best Time: anytime     Can we leave a detailed message on this number? Not Applicable    Call taken on 4/4/2018 at 1:59 PM by Sylvester Simmons

## 2018-04-04 NOTE — TELEPHONE ENCOUNTER
This writer attempted to contact Nayla on 04/04/18      Reason for call Notified prescription was sent and left detailed message.      If patient calls back:   Contact care team.        Guillermo Engel CMA

## 2018-04-04 NOTE — TELEPHONE ENCOUNTER
Returned call to pharmacy. Needed clarification on how often patient should apply medication. Verified once daily.    Guillermo Engel CMA

## 2018-04-04 NOTE — TELEPHONE ENCOUNTER
I will refill this prescription now- sent. Please notify. If continuing problems after this course of therapy then advise Dermatology evaluation.   Ankur Ruiz MD

## 2018-08-22 ENCOUNTER — RADIANT APPOINTMENT (OUTPATIENT)
Dept: ULTRASOUND IMAGING | Facility: CLINIC | Age: 23
End: 2018-08-22
Attending: OBSTETRICS & GYNECOLOGY
Payer: COMMERCIAL

## 2018-08-22 ENCOUNTER — PRENATAL OFFICE VISIT (OUTPATIENT)
Dept: OBGYN | Facility: CLINIC | Age: 23
End: 2018-08-22
Payer: COMMERCIAL

## 2018-08-22 VITALS
SYSTOLIC BLOOD PRESSURE: 131 MMHG | DIASTOLIC BLOOD PRESSURE: 85 MMHG | WEIGHT: 195 LBS | HEART RATE: 86 BPM | TEMPERATURE: 98.6 F | BODY MASS INDEX: 32.45 KG/M2

## 2018-08-22 DIAGNOSIS — Z34.80 SUPERVISION OF OTHER NORMAL PREGNANCY, ANTEPARTUM: ICD-10-CM

## 2018-08-22 DIAGNOSIS — Z32.01 PREGNANCY TEST POSITIVE: Primary | ICD-10-CM

## 2018-08-22 DIAGNOSIS — Z29.13 NEED FOR RHOGAM DUE TO RH NEGATIVE MOTHER: ICD-10-CM

## 2018-08-22 LAB
ABO + RH BLD: NORMAL
ABO + RH BLD: NORMAL
BETA HCG QUAL IFA URINE: POSITIVE
BLD GP AB SCN SERPL QL: NORMAL
BLOOD BANK CMNT PATIENT-IMP: NORMAL
ERYTHROCYTE [DISTWIDTH] IN BLOOD BY AUTOMATED COUNT: 15.4 % (ref 10–15)
HBV SURFACE AG SERPL QL IA: NONREACTIVE
HCT VFR BLD AUTO: 36.3 % (ref 35–47)
HGB BLD-MCNC: 12.5 G/DL (ref 11.7–15.7)
HIV 1+2 AB+HIV1 P24 AG SERPL QL IA: NONREACTIVE
MCH RBC QN AUTO: 26.7 PG (ref 26.5–33)
MCHC RBC AUTO-ENTMCNC: 34.4 G/DL (ref 31.5–36.5)
MCV RBC AUTO: 78 FL (ref 78–100)
PLATELET # BLD AUTO: 236 10E9/L (ref 150–450)
RBC # BLD AUTO: 4.68 10E12/L (ref 3.8–5.2)
SPECIMEN EXP DATE BLD: NORMAL
WBC # BLD AUTO: 7.5 10E9/L (ref 4–11)

## 2018-08-22 PROCEDURE — 36415 COLL VENOUS BLD VENIPUNCTURE: CPT | Performed by: OBSTETRICS & GYNECOLOGY

## 2018-08-22 PROCEDURE — 86762 RUBELLA ANTIBODY: CPT | Performed by: OBSTETRICS & GYNECOLOGY

## 2018-08-22 PROCEDURE — 87340 HEPATITIS B SURFACE AG IA: CPT | Performed by: OBSTETRICS & GYNECOLOGY

## 2018-08-22 PROCEDURE — 87389 HIV-1 AG W/HIV-1&-2 AB AG IA: CPT | Performed by: OBSTETRICS & GYNECOLOGY

## 2018-08-22 PROCEDURE — 86780 TREPONEMA PALLIDUM: CPT | Performed by: OBSTETRICS & GYNECOLOGY

## 2018-08-22 PROCEDURE — 86850 RBC ANTIBODY SCREEN: CPT | Performed by: OBSTETRICS & GYNECOLOGY

## 2018-08-22 PROCEDURE — 76801 OB US < 14 WKS SINGLE FETUS: CPT

## 2018-08-22 PROCEDURE — 87491 CHLMYD TRACH DNA AMP PROBE: CPT | Performed by: OBSTETRICS & GYNECOLOGY

## 2018-08-22 PROCEDURE — 86900 BLOOD TYPING SEROLOGIC ABO: CPT | Performed by: OBSTETRICS & GYNECOLOGY

## 2018-08-22 PROCEDURE — 85027 COMPLETE CBC AUTOMATED: CPT | Performed by: OBSTETRICS & GYNECOLOGY

## 2018-08-22 PROCEDURE — 99207 ZZC FIRST OB VISIT: CPT | Performed by: OBSTETRICS & GYNECOLOGY

## 2018-08-22 PROCEDURE — 84703 CHORIONIC GONADOTROPIN ASSAY: CPT | Performed by: OBSTETRICS & GYNECOLOGY

## 2018-08-22 PROCEDURE — 86901 BLOOD TYPING SEROLOGIC RH(D): CPT | Performed by: OBSTETRICS & GYNECOLOGY

## 2018-08-22 NOTE — PATIENT INSTRUCTIONS
If you have any questions regarding your visit, Please contact your care team.     [employer] Services: [phone]    [facility] CLINIC HOURS TELEPHONE NUMBER       KAREN Dyer-      Halima Li-Medical Assistant       Monday-[place]  8:00a.m-4:45 p.m  Tuesday-[place]  9:00a.m-11:45 a.m  Wednesday-[place] 8:00a.m-4:45 p.m.  Thursday-[place]  8:00a.m-4:45 p.m.  Friday-[place]  8:00a.m-4:45 p.m. [facility]  [address] [place] [address]  [phone] ask [facility]  [phone] Fax  Imaging [phone]    [facility] Labor and Delivery  [address] [place] [address] [place] [address]  [phone]    [facility]  [address] [place] [address]  [phone] [facility]  [phone] Fax  Imaging [phone]     Urgent Care locations:    [place]        [place] Monday-Friday  5 pm - 9 pm  Saturday and Sunday   9 am - 5 pm    Monday-Friday   11 am - 9 pm  Saturday and Sunday   9 am - 5 pm   [phone]       If you need a medication refill, please contact your pharmacy. Please allow 3 business days for your refill to be completed.  As always, Thank you for trusting us with your healthcare needs!

## 2018-08-22 NOTE — MR AVS SNAPSHOT
After Visit Summary   8/22/2018    Nayla Mcmanus    MRN: [id number]           Patient Information     Date Of Birth          1995        Visit Information        Provider Department      8/22/2018 8:00 AM Ankur Ruiz MD [facility]        Today's Diagnoses     Pregnancy test positive    -  1    Need for rhogam due to Rh negative mother        Supervision of other normal pregnancy, antepartum          Care Instructions                                                        If you have any questions regarding your visit, Please contact your care team.     [facility] Access Services: [phone]    [facility] CLINIC HOURS TELEPHONE NUMBER       KAREN Dyer-      Halima Li-Medical Assistant       Monday-[place]  8:00a.m-4:45 p.m  [facility]  9:00a.m-11:45 a.m  Wednesday-[place] 8:00a.m-4:45 p.m.  Thursday-[place]  8:00a.m-4:45 p.m.  Friday[facility]  8:00a.m-4:45 p.m. [facility]  [address] [place]  [place] [address] [phone] ask [facility]  [phone] Fax  Imaging [phone]    [facility] Labor and Delivery  [address] [place] [address] [place] [address]  [phone]    [facility]  [address] [facility] [place] [address] [phone] ask [facility]  [phone] Fax  Imaging [phone]     Urgent Care locations:    [facility] Monday-Friday  5 pm - 9 pm  Saturday and Sunday   9 am - 5 pm    Monday-Friday   11 am - 9 pm  Saturday and Sunday   9 am - 5 pm   [phone]       If you need a medication refill, please contact your pharmacy. Please allow 3 business days for your refill to be completed.  As always, Thank you for trusting us with your healthcare needs!            Follow-ups after your visit        Who to contact     If you have questions or need follow up  information about today's clinic visit or your schedule please contact [facility] directly at [phone].  Normal or non-critical lab and imaging results will be communicated to you by MyChart, letter or phone within 4 business days after the clinic has received the results. If you do not hear from us within 7 days, please contact the clinic through [employer] or phone. If you have a critical or abnormal lab result, we will notify you by phone as soon as possible.  Submit refill requests through WeLink or call your pharmacy and they will forward the refill request to us. Please allow 3 business days for your refill to be completed.          Additional Information About Your Visit        [employer] Information     WeLink gives you secure access to your electronic health record. If you see a primary care provider, you can also send messages to your care team and make appointments. If you have questions, please call your primary care clinic.  If you do not have a primary care provider, please call [phone] and they will assist you.        Care EveryWhere ID     This is your Care EveryWhere ID. This could be used by other organizations to access your [place] medical records  [id number]        Your Vitals Were     Pulse Temperature Last Period Breastfeeding? BMI (Body Mass Index)       86 98.6  F (37  C) (Oral) 05/29/2018 No 32.45 kg/m2        Blood Pressure from Last 3 Encounters:   08/22/18 131/85   12/07/17 142/82   11/08/17 134/83    Weight from Last 3 Encounters:   08/22/18 195 lb (88.5 kg)   12/07/17 196 lb (88.9 kg)   11/08/17 191 lb (86.6 kg)              We Performed the Following     Beta HCG qual IFA urine        Primary Care Provider Office Phone # Fax #    Emily Jarrett [phone] [address] [place] [address] [address]        Equal Access to Services     JAMIE KAY AH: Shante Diez, katia hager, nestor bennett  wagnergregorybi davidsonjennifer sumayaremi laemabi ah. So [facility] [phone].    ATENCIÓN: Si habla anali, tiene a reardon disposición servicios gratuitos de asistencia lingüística. Maggie al [phone].    We comply with applicable federal civil rights laws and [place] laws. We do not discriminate on the basis of race, color, national origin, age, disability, sex, sexual orientation, or gender identity.            Thank you!     Thank you for choosing [facility]  for your care. Our goal is always to provide you with excellent care. Hearing back from our patients is one way we can continue to improve our services. Please take a few minutes to complete the written survey that you may receive in the mail after your visit with us. Thank you!             Your Updated Medication List - Protect others around you: Learn how to safely use, store and throw away your medicines at www.disposemymeds.org.          This list is accurate as of 8/22/18  8:14 AM.  Always use your most recent med list.                   Brand Name Dispense Instructions for use Diagnosis    ketoconazole 2 % shampoo    NIZORAL    120 mL    Apply to the affected area and wash off after 5 minutes.    Dermatophytosis of body       prenatal multivitamin plus iron 27-0.8 MG Tabs per tablet      Take 1 tablet by mouth daily

## 2018-08-23 PROBLEM — L92.9 GRANULATION TISSUE AT OBSTETRICAL LACERATION SITE: Status: RESOLVED | Noted: 2017-12-09 | Resolved: 2018-08-23

## 2018-08-23 LAB
C TRACH DNA SPEC QL NAA+PROBE: NEGATIVE
RUBV IGG SERPL IA-ACNC: 55 IU/ML
SPECIMEN SOURCE: NORMAL
T PALLIDUM AB SER QL: NONREACTIVE

## 2018-08-23 NOTE — PROGRESS NOTES
"\"O LAY see uh\" Nayla Mcmanus is a 22 year old year old G 3 P 1 who presents for pregnancy confirmation and an initial obstetrical visit.  Referred by self.    Currently experiencing normal pregnancy symptoms without particular problems including pain, bleeding, marked vomiting or weight loss except: mod N/V.  This was a semi-planned pregnancy. Menses regular every 30 days. Stopped condom use in Feb and stopped nursing in March. Daughter is doing well.   Here today alone.   Additional concerns: past SAB x 1, past granulation tissue after last delivery that regressed.     ROS:     Systems reviewed include constitutional; breast;                 cardiac; respiratory; gastrointestinal; genitourinary;                                musculoskeletal; integumentary; psychological;                                hematologic/lymphatic and endocrine.                  These systems were negative for significant symptoms except                 for the following: none and see above HPI.    Past medical, obstetrical, surgical, family and social history reviewed and as noted or updated in chart.     Allergies, meds and supplements are as noted or updated in chart.                    Episode OB dating, demographic and history forms completed or reviewed.    EXAM:  VS as noted. BMI- Body mass index is 32.45 kg/(m^2).    Relatively recent normal general exam- not repeated now.       ASSESSMENT: (Z32.01) Pregnancy test positive  (primary encounter diagnosis)  Comment:   Plan: Beta HCG qual IFA urine            (Z29.13) Need for rhogam due to Rh negative mother  Comment:   Plan:     (Z34.80) Supervision of other normal pregnancy, antepartum  Comment:   Plan: ABO/Rh type and screen, CBC with platelets,         Hepatitis B surface antigen, Rubella Antibody         IgG Quantitative, HIV Antigen Antibody Combo,         Treponema Abs w Reflex to RPR and Titer,         Chlamydia trachomatis PCR, US OB < 14 Weeks         Single           "     PLAN:  Advice appropriate to gestational age reviewed including pertinent Checklist items. Discussed condition, tests and general care plan. Symptoms, problems and concerns reviewed. Recommended weight gain discussed. Problem list initiated. Check u/s now. Pap due in 2 yrs. Orders as noted. RTC in 4 weeks. UC and discuss special screening tests next.    Ankur Ruiz MD

## 2018-09-20 ENCOUNTER — PRENATAL OFFICE VISIT (OUTPATIENT)
Dept: OBGYN | Facility: CLINIC | Age: 23
End: 2018-09-20
Payer: COMMERCIAL

## 2018-09-20 VITALS
BODY MASS INDEX: 31.78 KG/M2 | WEIGHT: 191 LBS | HEART RATE: 82 BPM | SYSTOLIC BLOOD PRESSURE: 120 MMHG | DIASTOLIC BLOOD PRESSURE: 78 MMHG | TEMPERATURE: 98.2 F

## 2018-09-20 DIAGNOSIS — Z34.80 SUPERVISION OF OTHER NORMAL PREGNANCY, ANTEPARTUM: Primary | ICD-10-CM

## 2018-09-20 PROCEDURE — 99207 ZZC PRENATAL VISIT: CPT | Performed by: OBSTETRICS & GYNECOLOGY

## 2018-09-20 PROCEDURE — 87086 URINE CULTURE/COLONY COUNT: CPT | Performed by: OBSTETRICS & GYNECOLOGY

## 2018-09-20 NOTE — PATIENT INSTRUCTIONS
If you have any questions regarding your visit, Please contact your care team.     [employer] Services: [phone]    Women s Health CLINIC HOURS TELEPHONE NUMBER       KAREN Dyer-      Halima Li-Medical Assistant       Monday-[place]  8:00a.m-4:45 p.m  Tuesday-[place]  9:00a.m-11:45 a.m  Wednesday-[place] 8:00a.m-4:45 p.m.  Thursday-[place]  8:00a.m-4:45 p.m.  Friday-[place]  8:00a.m-4:45 p.m. [facility]  [address] [place] [address]  [phone] ask [facility]  [phone] Fax  Imaging [phone]    [facility] Labor and Delivery  [address] [place] [address] [place] [address]  [phone]    Health system  [address] [place] [address]  [phone] [facility]  [phone] Fax  Imaging [phone]     Urgent Care locations:    [place]        [place] Monday-Friday  5 pm - 9 pm  Saturday and Sunday   9 am - 5 pm    Monday-Friday   11 am - 9 pm  Saturday and Sunday   9 am - 5 pm   [phone]       If you need a medication refill, please contact your pharmacy. Please allow 3 business days for your refill to be completed.  As always, Thank you for trusting us with your healthcare needs!

## 2018-09-20 NOTE — PROGRESS NOTES
No signif signs, symptoms or concerns except some continued N/V. MARK reviewed and adjusted.   Discussed special diagnostic and screening tests and plans (y = yes, n = no/declined, u = uncertain/considering): quad scr = n, survey u/s = y, Level 2 survey u/s with MFM = n, CF carrier scr = n, hemoglobinopathy or thalassemia scr= n, SMA, fragile X  and other genetic scr= n, 1st trimester scr with NT and later AFP or with cell free DNA and later AFP = n, cell free DNA= n, genetic amnio/CVS = n.  Advice appropriate to gestational age reviewed including pertinent Checklist items. Discussed condition, tests and care plan including RBA. Problem list updated.   A/P:  Nayla was seen today for prenatal care.    Diagnoses and all orders for this visit:    Supervision of other normal pregnancy, antepartum  -     Urine Culture Aerobic Bacterial        Ankur Ruiz MD

## 2018-09-20 NOTE — MR AVS SNAPSHOT
After Visit Summary   9/20/2018    Nayla Mcmanus    MRN: [id number]           Patient Information     Date Of Birth          1995        Visit Information        Provider Department      9/20/2018 8:00 AM Ankur Ruiz MD [facility]        Care Instructions                                                        If you have any questions regarding your visit, Please contact your care team.     [facility] Access Services: [phone]    [facility] CLINIC HOURS TELEPHONE NUMBER       KAREN Dyer-      Halima Li-Medical Assistant       Monday-[place]  8:00a.m-4:45 p.m  Tuesday-[place]  9:00a.m-11:45 a.m  Wednesday-[place] 8:00a.m-4:45 p.m.  Thursday-[place]  8:00a.m-4:45 p.m.  Friday-[place]  8:00a.m-4:45 p.m. [facility]  [address] [address] [place] [address]. [place].  [place], [place] [address] [phone] ask for [facility]  [phone] Fax  Imaging [phone]    [facility] Labor and Delivery  [address] [place] [address] Dr.  [place], [place] [address] [phone]    [facility]  [address] [place] [address]  [phone] ask [facility]  [phone] Fax  Imaging [phone]     Urgent Care locations:    [facility] Monday-Friday  5 pm - 9 pm  Saturday and Sunday   9 am - 5 pm    Monday-Friday   11 am - 9 pm  Saturday and Sunday   9 am - 5 pm   [phone]       If you need a medication refill, please contact your pharmacy. Please allow 3 business days for your refill to be completed.  As always, Thank you for trusting us with your healthcare needs!            Follow-ups after your visit        Who to contact     If you have questions or need follow up information about today's clinic visit or your schedule please contact [facility] directly at [phone].  Normal or non-critical lab and imaging  results will be communicated to you by MyChart, letter or phone within 4 business days after the clinic has received the results. If you do not hear from us within 7 days, please contact the clinic through [employer] or phone. If you have a critical or abnormal lab result, we will notify you by phone as soon as possible.  Submit refill requests through [employer] or call your pharmacy and they will forward the refill request to us. Please allow 3 business days for your refill to be completed.          Additional Information About Your Visit        [employer] Information     [employer] gives you secure access to your electronic health record. If you see a primary care provider, you can also send messages to your care team and make appointments. If you have questions, please call your primary care clinic.  If you do not have a primary care provider, please call [phone] and they will assist you.        Care EveryWhere ID     This is your Care EveryWhere ID. This could be used by other organizations to access your [place] medical records  [id number]        Your Vitals Were     Pulse Temperature Last Period Breastfeeding? BMI (Body Mass Index)       82 98.2  F (36.8  C) (Oral) 05/31/2018 (Exact Date) No 31.78 kg/m2        Blood Pressure from Last 3 Encounters:   09/20/18 120/78   08/22/18 131/85   12/07/17 142/82    Weight from Last 3 Encounters:   09/20/18 191 lb (86.6 kg)   08/22/18 195 lb (88.5 kg)   12/07/17 196 lb (88.9 kg)              Today, you had the following     No orders found for display       Primary Care Provider Office Phone # Fax #    Emily Jarrett [phone] [address]  [facility] [address]        Equal Access to Services     [facility] : Hadii francisco Diez, katia hager, isi mulleralmada nestor tucker. So [facility] [phone].    ATENCIÓN: Si habla español, tiene a reardon disposición servicios gratuitos de asistencia  lingüística. Maggie al [phone].    We comply with applicable federal civil rights laws and [place] laws. We do not discriminate on the basis of race, color, national origin, age, disability, sex, sexual orientation, or gender identity.            Thank you!     Thank you for choosing [facility]  for your care. Our goal is always to provide you with excellent care. Hearing back from our patients is one way we can continue to improve our services. Please take a few minutes to complete the written survey that you may receive in the mail after your visit with us. Thank you!             Your Updated Medication List - Protect others around you: Learn how to safely use, store and throw away your medicines at www.disposemymeds.org.          This list is accurate as of 9/20/18  8:01 AM.  Always use your most recent med list.                   Brand Name Dispense Instructions for use Diagnosis    ketoconazole 2 % shampoo    NIZORAL    120 mL    Apply to the affected area and wash off after 5 minutes.    Dermatophytosis of body       prenatal multivitamin plus iron 27-0.8 MG Tabs per tablet      Take 1 tablet by mouth daily

## 2018-09-21 LAB
BACTERIA SPEC CULT: NORMAL
SPECIMEN SOURCE: NORMAL

## 2018-10-17 ENCOUNTER — PRENATAL OFFICE VISIT (OUTPATIENT)
Dept: OBGYN | Facility: CLINIC | Age: 23
End: 2018-10-17
Payer: COMMERCIAL

## 2018-10-17 VITALS
SYSTOLIC BLOOD PRESSURE: 119 MMHG | WEIGHT: 188 LBS | HEART RATE: 76 BPM | DIASTOLIC BLOOD PRESSURE: 74 MMHG | TEMPERATURE: 98.2 F | BODY MASS INDEX: 31.28 KG/M2

## 2018-10-17 DIAGNOSIS — Z34.80 SUPERVISION OF OTHER NORMAL PREGNANCY, ANTEPARTUM: ICD-10-CM

## 2018-10-17 DIAGNOSIS — Z29.13 NEED FOR RHOGAM DUE TO RH NEGATIVE MOTHER: ICD-10-CM

## 2018-10-17 PROCEDURE — 99207 ZZC PRENATAL VISIT: CPT | Performed by: OBSTETRICS & GYNECOLOGY

## 2018-10-17 NOTE — PATIENT INSTRUCTIONS
If you have any questions regarding your visit, Please contact your care team.     [employer] Services: [phone]    Women s Health CLINIC HOURS TELEPHONE NUMBER       KAREN Dyer-      Halima Li-Medical Assistant       Monday-[place]  8:00a.m-4:45 p.m  Tuesday-[place]  9:00a.m-11:45 a.m  Wednesday-[place] 8:00a.m-4:45 p.m.  Thursday-[place]  8:00a.m-4:45 p.m.  Friday-[place]  8:00a.m-4:45 p.m. [facility]  [address] [place] [address]  [phone] ask [facility]  [phone] Fax  Imaging [phone]    [facility] Labor and Delivery  [address] [place] [address] [place] [address]  [phone]    [facility]  [address] [place] [address]  [phone] [facility]  [phone] Fax  Imaging [phone]     Urgent Care locations:    [place]        [place] Monday-Friday  5 pm - 9 pm  Saturday and Sunday   9 am - 5 pm    Monday-Friday   11 am - 9 pm  Saturday and Sunday   9 am - 5 pm   [phone]       If you need a medication refill, please contact your pharmacy. Please allow 3 business days for your refill to be completed.  As always, Thank you for trusting us with your healthcare needs!

## 2018-10-17 NOTE — MR AVS SNAPSHOT
After Visit Summary   10/17/2018    Nayla Mcmanus    MRN: [id number]           Patient Information     Date Of Birth          1995        Visit Information        Provider Department      10/17/2018 8:30 AM Ankur Ruiz MD [facility]        Today's Diagnoses     Supervision of other normal pregnancy, antepartum        Need for rhogam due to Rh negative mother          Care Instructions                                                        If you have any questions regarding your visit, Please contact your care team.     [facility] Access Services: [phone]    [facility] CLINIC HOURS TELEPHONE NUMBER       KAREN Dyer-      Halima Li-Medical Assistant       Monday-[place]  8:00a.m-4:45 p.m  Tuesday-[place]  9:00a.m-11:45 a.m  Wednesday-[place] 8:00a.m-4:45 p.m.  Thursday-[place]  8:00a.m-4:45 p.m.  Friday-[place]  8:00a.m-4:45 p.m. [facility]  [address] [place] [address]  [phone] ask [facility]  [phone] Fax  Imaging [phone]    [facility] Labor and Delivery  [address] [place] [address] [place] [address]  [phone]    [facility]  [address] [place] [address]  [phone] [facility]  [phone] Fax  Imaging [phone]     Urgent Care locations:    [facility] Monday-Friday  5 pm - 9 pm  Saturday and Sunday   9 am - 5 pm    Monday-Friday   11 am - 9 pm  Saturday and Sunday   9 am - 5 pm   [phone]       If you need a medication refill, please contact your pharmacy. Please allow 3 business days for your refill to be completed.  As always, Thank you for trusting us with your healthcare needs!            Follow-ups after your visit        Your next 10 appointments already scheduled     Oct 17, 2018  8:30 AM CDT   ESTABLISHED PRENATAL  with Ankur Ruiz MD   [facility] ([facility])    [address] [place] [address] [phone]              Who to contact     If you have questions or need follow up information about today's clinic visit or your schedule please contact [facility] directly at [phone].  Normal or non-critical lab and imaging results will be communicated to you by MyChart, letter or phone within 4 business days after the clinic has received the results. If you do not hear from us within 7 days, please contact the clinic through [employer] or phone. If you have a critical or abnormal lab result, we will notify you by phone as soon as possible.  Submit refill requests through [employer] or call your pharmacy and they will forward the refill request to us. Please allow 3 business days for your refill to be completed.          Additional Information About Your Visit        [employer] Information     [employer] gives you secure access to your electronic health record. If you see a primary care provider, you can also send messages to your care team and make appointments. If you have questions, please call your primary care clinic.  If you do not have a primary care provider, please call [phone] and they will assist you.        Care EveryWhere ID     This is your Care EveryWhere ID. This could be used by other organizations to access your [place] medical records  [id number]        Your Vitals Were     Pulse Temperature Last Period Breastfeeding? BMI (Body Mass Index)       76 98.2  F (36.8  C) (Oral) 05/31/2018 (Exact Date) No 31.28 kg/m2        Blood Pressure from Last 3 Encounters:   10/17/18 119/74   09/20/18 120/78   08/22/18 131/85    Weight from Last 3 Encounters:   10/17/18 188 lb (85.3 kg)   09/20/18 191 lb (86.6 kg)   08/22/18 195 lb (88.5 kg)              Today, you had the following     No orders found for display       Primary Care Provider  Office Phone # Fax #    Emily MARCOS Jarrett [phone]       [facility]  [facility] [address]        Equal Access to Services     JAMIE KAY : Shante Diez, walanda reemadoreneha, qaybta kayuridiada garrett, nestor mcleodbi davidsonjennifer mcdonough sera holland. So [facility] [phone].    ATENCIÓN: Si habla español, tiene a reardon disposición servicios gratuitos de asistencia lingüística. Llame al [phone].    We comply with applicable federal civil rights laws and [place] laws. We do not discriminate on the basis of race, color, national origin, age, disability, sex, sexual orientation, or gender identity.            Thank you!     Thank you for choosing [facility]  for your care. Our goal is always to provide you with excellent care. Hearing back from our patients is one way we can continue to improve our services. Please take a few minutes to complete the written survey that you may receive in the mail after your visit with us. Thank you!             Your Updated Medication List - Protect others around you: Learn how to safely use, store and throw away your medicines at www.disposemymeds.org.          This list is accurate as of 10/17/18  8:29 AM.  Always use your most recent med list.                   Brand Name Dispense Instructions for use Diagnosis    ketoconazole 2 % shampoo    NIZORAL    120 mL    Apply to the affected area and wash off after 5 minutes.    Dermatophytosis of body       prenatal multivitamin plus iron 27-0.8 MG Tabs per tablet      Take 1 tablet by mouth daily

## 2018-10-17 NOTE — PROGRESS NOTES
No signif signs, symptoms or concerns. Nausea improved. Advice appropriate to gestational age reviewed including pertinent Checklist items. Discussed condition, tests and care plan including RBA. Problem list updated. Survey ultrasound next.  A/P:  Nayla was seen today for prenatal care.    Diagnoses and all orders for this visit:    Supervision of other normal pregnancy, antepartum  -     US OB > 14 Weeks; Future    Need for rhogam due to Rh negative mother        Ankur Ruiz MD

## 2018-10-25 ENCOUNTER — RADIANT APPOINTMENT (OUTPATIENT)
Dept: ULTRASOUND IMAGING | Facility: CLINIC | Age: 23
End: 2018-10-25
Attending: OBSTETRICS & GYNECOLOGY
Payer: COMMERCIAL

## 2018-10-25 DIAGNOSIS — Z34.80 SUPERVISION OF OTHER NORMAL PREGNANCY, ANTEPARTUM: ICD-10-CM

## 2018-10-25 LAB — RADIOLOGIST FLAGS: NORMAL

## 2018-10-25 PROCEDURE — 76805 OB US >/= 14 WKS SNGL FETUS: CPT | Performed by: STUDENT IN AN ORGANIZED HEALTH CARE EDUCATION/TRAINING PROGRAM

## 2018-10-26 ENCOUNTER — TELEPHONE (OUTPATIENT)
Dept: OBGYN | Facility: CLINIC | Age: 23
End: 2018-10-26

## 2018-11-15 ENCOUNTER — PRENATAL OFFICE VISIT (OUTPATIENT)
Dept: OBGYN | Facility: CLINIC | Age: 23
End: 2018-11-15
Payer: COMMERCIAL

## 2018-11-15 VITALS
WEIGHT: 189 LBS | DIASTOLIC BLOOD PRESSURE: 74 MMHG | TEMPERATURE: 97.9 F | HEART RATE: 78 BPM | BODY MASS INDEX: 31.45 KG/M2 | SYSTOLIC BLOOD PRESSURE: 118 MMHG

## 2018-11-15 DIAGNOSIS — Z34.80 SUPERVISION OF OTHER NORMAL PREGNANCY, ANTEPARTUM: ICD-10-CM

## 2018-11-15 DIAGNOSIS — Z29.13 NEED FOR RHOGAM DUE TO RH NEGATIVE MOTHER: ICD-10-CM

## 2018-11-15 PROCEDURE — 99207 ZZC PRENATAL VISIT: CPT | Performed by: OBSTETRICS & GYNECOLOGY

## 2018-11-15 NOTE — PATIENT INSTRUCTIONS
If you have any questions regarding your visit, Please contact your care team.     [employer]: [phone]    Women s Health CLINIC HOURS TELEPHONE NUMBER       KAREN Dyer-      Halima Li-Medical Assistant       Monday-[place]  8:00a.m-4:45 p.m  Tuesday-[place]  9:00a.m-11:45 a.m  Wednesday-[place] 8:00a.m-4:45 p.m.  Thursday-[place]  8:00a.m-4:45 p.m.  Friday-[place]  8:00a.m-4:45 p.m. [facility]  [address] [place] [address]  [phone] ask [facility]  [phone] Fax  Imaging [phone]    [facility] Labor and Delivery  [address] [place] [address] [place] [address]  [phone]    [facility]  [address] [place] [address]  [phone] [facility]  [phone] Fax  Imaging [phone]     Urgent Care locations:    [place]        [place] Monday-Friday  5 pm - 9 pm  Saturday and Sunday   9 am - 5 pm    Monday-Friday   11 am - 9 pm  Saturday and Sunday   9 am - 5 pm   [phone]       If you need a medication refill, please contact your pharmacy. Please allow 3 business days for your refill to be completed.  As always, Thank you for trusting us with your healthcare needs!

## 2018-11-15 NOTE — MR AVS SNAPSHOT
After Visit Summary   11/15/2018    Nayla Mcmanus    MRN: [id number]           Patient Information     Date Of Birth          1995        Visit Information        Provider Department      11/15/2018 9:00 AM Ankur Ruiz MD [facility]        Today's Diagnoses     Supervision of other normal pregnancy, antepartum        Need for rhogam due to Rh negative mother          Care Instructions                                                        If you have any questions regarding your visit, Please contact your care team.     [facility] Access Services: [phone]    [facility] CLINIC HOURS TELEPHONE NUMBER       KAREN Dyer-      Halima Li-Medical Assistant       Monday-[place]  8:00a.m-4:45 p.m  Tuesday-[place]  9:00a.m-11:45 a.m  Wednesday-[place] 8:00a.m-4:45 p.m.  Thursday-[place]  8:00a.m-4:45 p.m.  Friday-[place]  8:00a.m-4:45 p.m. [facility]  [address] [place] [address]  [phone] ask [facility] [place]  [phone] Fax  Imaging [phone]    [facility] Labor and Delivery  [address] [place] [address] [place] [address]  [phone]    [facility]  [address] [place] [place] [address]  [phone] [facility]  [phone] Fax  Imaging [phone]     Urgent Care locations:    [facility] Monday-Friday  5 pm - 9 pm  Saturday and Sunday   9 am - 5 pm    Monday-Friday   11 am - 9 pm  Saturday and Sunday   9 am - 5 pm   [phone]       If you need a medication refill, please contact your pharmacy. Please allow 3 business days for your refill to be completed.  As always, Thank you for trusting us with your healthcare needs!            Follow-ups after your visit        Your next 10 appointments already scheduled     Nov 15, 2018  9:00 AM CST   ESTABLISHED PRENATAL  with Ankur Ruiz MD   [facility] ([facility])    [address] [place] [id number] [phone]              Who to contact     If you have questions or need follow up information about today's clinic visit or your schedule please contact [facility] directly at [phone].  Normal or non-critical lab and imaging results will be communicated to you by MyChart, letter or phone within 4 business days after the clinic has received the results. If you do not hear from us within 7 days, please contact the clinic through Be Herehart or phone. If you have a critical or abnormal lab result, we will notify you by phone as soon as possible.  Submit refill requests through PanX or call your pharmacy and they will forward the refill request to us. Please allow 3 business days for your refill to be completed.          Additional Information About Your Visit        Be Here[employer] Information     PanX gives you secure access to your electronic health record. If you see a primary care provider, you can also send messages to your care team and make appointments. If you have questions, please call your primary care clinic.  If you do not have a primary care provider, please call [phone] and they will assist you.        Care EveryWhere ID     This is your Care EveryWhere ID. This could be used by other organizations to access your [facility] medical records  [id number]        Your Vitals Were     Pulse Temperature Last Period Breastfeeding? BMI (Body Mass Index)       78 97.9  F (36.6  C) (Oral) 05/31/2018 (Exact Date) No 31.45 kg/m2        Blood Pressure from Last 3 Encounters:   11/15/18 118/74   10/17/18 119/74   09/20/18 120/78    Weight from Last 3 Encounters:   11/15/18 189 lb (85.7 kg)   10/17/18 188 lb (85.3 kg)   09/20/18 191 lb (86.6 kg)              Today, you had the following     No orders found for display       Primary Care Provider  Office Phone # Fax #    Emily MARCOS Jarrett [phone]       [facility]  [facility] [address]        Equal Access to Services     JAMIE KAY : Shante Diez, walanda reemadoreneha, qaybta kayuridiada garrett, nestor mcleodbi davidsonjennifer mcdonough sera holland. So [facility] [phone].    ATENCIÓN: Si habla español, tiene a reardon disposición servicios gratuitos de asistencia lingüística. Llame al [phone].    We comply with applicable federal civil rights laws and [place] laws. We do not discriminate on the basis of race, color, national origin, age, disability, sex, sexual orientation, or gender identity.            Thank you!     Thank you for choosing [facility]  for your care. Our goal is always to provide you with excellent care. Hearing back from our patients is one way we can continue to improve our services. Please take a few minutes to complete the written survey that you may receive in the mail after your visit with us. Thank you!             Your Updated Medication List - Protect others around you: Learn how to safely use, store and throw away your medicines at www.disposemymeds.org.          This list is accurate as of 11/15/18  8:56 AM.  Always use your most recent med list.                   Brand Name Dispense Instructions for use Diagnosis    ketoconazole 2 % shampoo    NIZORAL    120 mL    Apply to the affected area and wash off after 5 minutes.    Dermatophytosis of body       prenatal multivitamin plus iron 27-0.8 MG Tabs per tablet      Take 1 tablet by mouth daily

## 2018-12-12 ENCOUNTER — PRENATAL OFFICE VISIT (OUTPATIENT)
Dept: OBGYN | Facility: CLINIC | Age: 23
End: 2018-12-12
Payer: COMMERCIAL

## 2018-12-12 VITALS
SYSTOLIC BLOOD PRESSURE: 115 MMHG | TEMPERATURE: 98.1 F | BODY MASS INDEX: 31.78 KG/M2 | HEART RATE: 77 BPM | WEIGHT: 191 LBS | DIASTOLIC BLOOD PRESSURE: 71 MMHG

## 2018-12-12 DIAGNOSIS — Z29.13 NEED FOR RHOGAM DUE TO RH NEGATIVE MOTHER: ICD-10-CM

## 2018-12-12 DIAGNOSIS — Z34.80 SUPERVISION OF OTHER NORMAL PREGNANCY, ANTEPARTUM: ICD-10-CM

## 2018-12-12 LAB
ALBUMIN UR-MCNC: NEGATIVE MG/DL
APPEARANCE UR: NORMAL
BILIRUB UR QL STRIP: NEGATIVE
BLD GP AB SCN SERPL QL: NORMAL
COLOR UR AUTO: YELLOW
GLUCOSE UR STRIP-MCNC: NEGATIVE MG/DL
HGB BLD-MCNC: 11.1 G/DL (ref 11.7–15.7)
HGB UR QL STRIP: NEGATIVE
KETONES UR STRIP-MCNC: NEGATIVE MG/DL
LEUKOCYTE ESTERASE UR QL STRIP: NEGATIVE
NITRATE UR QL: NEGATIVE
PH UR STRIP: 6.5 PH (ref 5–7)
SOURCE: NORMAL
SP GR UR STRIP: 1.01 (ref 1–1.03)
UROBILINOGEN UR STRIP-ACNC: 0.2 EU/DL (ref 0.2–1)

## 2018-12-12 PROCEDURE — 85018 HEMOGLOBIN: CPT | Performed by: OBSTETRICS & GYNECOLOGY

## 2018-12-12 PROCEDURE — 99207 ZZC PRENATAL VISIT: CPT | Performed by: OBSTETRICS & GYNECOLOGY

## 2018-12-12 PROCEDURE — 36415 COLL VENOUS BLD VENIPUNCTURE: CPT | Performed by: OBSTETRICS & GYNECOLOGY

## 2018-12-12 PROCEDURE — 86850 RBC ANTIBODY SCREEN: CPT | Performed by: OBSTETRICS & GYNECOLOGY

## 2018-12-12 PROCEDURE — 96372 THER/PROPH/DIAG INJ SC/IM: CPT | Performed by: OBSTETRICS & GYNECOLOGY

## 2018-12-12 PROCEDURE — 81003 URINALYSIS AUTO W/O SCOPE: CPT | Performed by: OBSTETRICS & GYNECOLOGY

## 2018-12-12 NOTE — PATIENT INSTRUCTIONS
If you have any questions regarding your visit, Please contact your care team.     [employer] [facility]: [phone]    [facility] CLINIC HOURS TELEPHONE NUMBER       Ankur Ruiz M.D.      Courtney-    Halima Li-Medical Assistant       Monday-[place]  8:00a.m-4:45 p.m  Tuesday-[place]  9:00a.m-11:45 a.m  Wednesday-[place] 8:00a.m-4:45 p.m.  Thursday-[place]  8:00a.m-4:45 p.m.  Friday-[place]  8:00a.m-4:45 p.m. [facility]  [address] [place] [address]  [phone] ask for [facility]  [phone] Fax  Imaging Lewovmvpmq-[phone]    [facility] Labor and Delivery  [address] [place] [address] [place] [address]  [phone]    [facility]  [address] [place] [address]  [phone] ask [facility]  [phone] Fax  Imaging [phone]     Urgent Care locations:    [facility] Monday-Friday  5 pm - 9 pm  Saturday and Sunday   9 am - 5 pm    Monday-Friday   11 am - 9 pm  Saturday and Sunday   9 am - 5 pm   [phone]       If you need a medication refill, please contact your pharmacy. Please allow 3 business days for your refill to be completed.  As always, Thank you for trusting us with your healthcare needs!

## 2018-12-13 NOTE — PROGRESS NOTES
No signif signs, symptoms or concerns except interval gastroenteritis- recovered. Declined 1h GTT due to intolerance in the past and will follow urine for glycosuria. Advice appropriate to gestational age reviewed including pertinent Checklist items. Discussed condition, tests and care plan including RBA. Problem list updated.   A/P:  Nayla was seen today for prenatal care.    Diagnoses and all orders for this visit:    Supervision of other normal pregnancy, antepartum  -     Hemoglobin  -     Antibody screen red cell  -     Cancel: UA without Microscopic  -     UA without Microscopic  -     MED CHARGE: RH Immune Globulin 300 MCG  ()  -     ADMIN CHARGE: Injection Intramuscular or Sub-Q (94596)    Need for rhogam due to Rh negative mother  -     Antibody screen red cell  -     MED CHARGE: RH Immune Globulin 300 MCG  ()  -     ADMIN CHARGE: Injection Intramuscular or Sub-Q (42139)    Other orders  -     Cancel: rho,D, immune globulin (HYPERRHO/RHOGAM) 300 MCG injection; Inject 300 mcg into the muscle once for 1 dose        Ankur Ruiz MD

## 2019-01-09 ENCOUNTER — PRENATAL OFFICE VISIT (OUTPATIENT)
Dept: OBGYN | Facility: CLINIC | Age: 24
End: 2019-01-09
Payer: COMMERCIAL

## 2019-01-09 VITALS
HEART RATE: 75 BPM | TEMPERATURE: 97.7 F | DIASTOLIC BLOOD PRESSURE: 73 MMHG | WEIGHT: 193 LBS | BODY MASS INDEX: 32.12 KG/M2 | SYSTOLIC BLOOD PRESSURE: 112 MMHG

## 2019-01-09 DIAGNOSIS — Z29.13 NEED FOR RHOGAM DUE TO RH NEGATIVE MOTHER: ICD-10-CM

## 2019-01-09 DIAGNOSIS — Z34.80 SUPERVISION OF OTHER NORMAL PREGNANCY, ANTEPARTUM: ICD-10-CM

## 2019-01-09 LAB
ALBUMIN UR-MCNC: NEGATIVE MG/DL
APPEARANCE UR: ABNORMAL
BILIRUB UR QL STRIP: NEGATIVE
COLOR UR AUTO: YELLOW
GLUCOSE UR STRIP-MCNC: NEGATIVE MG/DL
HGB UR QL STRIP: NEGATIVE
KETONES UR STRIP-MCNC: NEGATIVE MG/DL
LEUKOCYTE ESTERASE UR QL STRIP: ABNORMAL
NITRATE UR QL: NEGATIVE
PH UR STRIP: 7 PH (ref 5–7)
SOURCE: ABNORMAL
SP GR UR STRIP: 1.01 (ref 1–1.03)
UROBILINOGEN UR STRIP-ACNC: 0.2 EU/DL (ref 0.2–1)

## 2019-01-09 PROCEDURE — 99207 ZZC PRENATAL VISIT: CPT | Performed by: OBSTETRICS & GYNECOLOGY

## 2019-01-09 PROCEDURE — 81003 URINALYSIS AUTO W/O SCOPE: CPT | Performed by: OBSTETRICS & GYNECOLOGY

## 2019-01-09 NOTE — PATIENT INSTRUCTIONS
If you have any questions regarding your visit, Please contact your care team.     [employer] [place] Access Services: [phone]    [facility] CLINIC HOURS TELEPHONE NUMBER       Ankur Ruiz M.D.      Courtney-    Halima Li-Medical Assistant       Monday-[place]  8:00a.m-4:45 p.m  Tuesday-[place]  9:00a.m-11:45 a.m  Wednesday-[place] 8:00a.m-4:45 p.m.  Thursday-[place]  8:00a.m-4:45 p.m.  Friday-[place]  8:00a.m-4:45 p.m. [facility]  [address] [place] [address]  [phone] ask for [facility]  [phone] Fax  Imaging [phone]    [facility] Labor and Delivery  [address] [place] [address] [place] [address]  [phone]    [facility]  [address] [place] [address]  [phone] ask [facility]  [phone] Fax  Imaging [phone]     Urgent Care locations:    [facility] Monday-Friday  5 pm - 9 pm  Saturday and Sunday   9 am - 5 pm    Monday-Friday   11 am - 9 pm  Saturday and Sunday   9 am - 5 pm   [phone]       If you need a medication refill, please contact your pharmacy. Please allow 3 business days for your refill to be completed.  As always, Thank you for trusting us with your healthcare needs!

## 2019-01-09 NOTE — PROGRESS NOTES
No signif signs, symptoms or concerns. Tdap declined. Advice appropriate to gestational age reviewed including pertinent Checklist items. Discussed condition, tests and care plan including RBA. Problem list updated.   A/P:  Nayla was seen today for prenatal care.    Diagnoses and all orders for this visit:    Supervision of other normal pregnancy, antepartum  -     UA without Microscopic    Need for rhogam due to Rh negative mother        Ankur Ruiz MD

## 2019-01-31 NOTE — PATIENT INSTRUCTIONS
If you have any questions regarding your visit, Please contact your care team.     [employer] Access Services: [phone]    [facility] Health CLINIC HOURS TELEPHONE NUMBER       Ankur Ruiz M.D.      Courtney-    Halima Li-Medical Assistant       Monday-[place]  8:00a.m-4:45 p.m  Tuesday-[place]  9:00a.m-11:45 a.m  Wednesday-[place] 8:00a.m-4:45 p.m.  Thursday-[place]  8:00a.m-4:45 p.m.  Friday-[place]  8:00a.m-4:45 p.m. [facility]  [address] [place] [address]  [phone] ask for [facility]  [phone] Fax  Imaging Vrjzqaklnf-[phone]    [facility] Labor and Delivery  [address] [place] [address] [place] [address]  [phone]    [facility]  [address] [place] [address]  [phone] ask [facility]  [phone] Fax  Imaging [phone]     Urgent Care locations:    [facility] Monday-Friday  5 pm - 9 pm  Saturday and Sunday   9 am - 5 pm    Monday-Friday   11 am - 9 pm  Saturday and Sunday   9 am - 5 pm   [phone]       If you need a medication refill, please contact your pharmacy. Please allow 3 business days for your refill to be completed.  As always, Thank you for trusting us with your healthcare needs!

## 2019-02-01 ENCOUNTER — PRENATAL OFFICE VISIT (OUTPATIENT)
Dept: OBGYN | Facility: CLINIC | Age: 24
End: 2019-02-01
Payer: COMMERCIAL

## 2019-02-01 VITALS
TEMPERATURE: 98.4 F | SYSTOLIC BLOOD PRESSURE: 118 MMHG | DIASTOLIC BLOOD PRESSURE: 72 MMHG | WEIGHT: 200.2 LBS | HEART RATE: 88 BPM | BODY MASS INDEX: 33.32 KG/M2

## 2019-02-01 DIAGNOSIS — Z34.80 SUPERVISION OF OTHER NORMAL PREGNANCY, ANTEPARTUM: ICD-10-CM

## 2019-02-01 DIAGNOSIS — Z29.13 NEED FOR RHOGAM DUE TO RH NEGATIVE MOTHER: ICD-10-CM

## 2019-02-01 PROCEDURE — 99207 ZZC PRENATAL VISIT: CPT | Performed by: OBSTETRICS & GYNECOLOGY

## 2019-02-01 PROCEDURE — 81003 URINALYSIS AUTO W/O SCOPE: CPT | Performed by: OBSTETRICS & GYNECOLOGY

## 2019-02-01 NOTE — PROGRESS NOTES
No signif signs, symptoms or concerns. Weight gain noted. Advice appropriate to gestational age reviewed including pertinent Checklist items. Discussed condition, tests and care plan including RBA. Problem list updated. GBS next.  A/P:  Nayla was seen today for prenatal care.    Diagnoses and all orders for this visit:    Supervision of other normal pregnancy, antepartum  -     UA without Microscopic    Need for rhogam due to Rh negative mother        Ankur Ruiz MD

## 2019-02-19 NOTE — PATIENT INSTRUCTIONS
If you have any questions regarding your visit, Please contact your care team.     [employer]: [phone]    [facility] CLINIC HOURS TELEPHONE NUMBER       KAREN Dyer-    Parminder Li-Medical Assistant       Monday-[place]  8:00a.m-4:45 p.m  Tuesday-[place]  9:00a.m-11:45 a.m  Wednesday-[place] 8:00a.m-4:45 p.m.  Thursday-[place]  8:00a.m-4:45 p.m.  Friday-[place]  8:00a.m-4:45 p.m. [facility]  [address] [place] [address]  [phone] ask [facility]  [phone] Fax  Imaging Jnuetwlnof-[phone]    [facility] Labor and Delivery  [address] [place] [address] [place] [address]  [phone]    [facility]  [address] [place]  [place] [address]  [phone] ask [facility]  [phone] Fax  Imaging [phone]     Urgent Care locations:    [facility] Monday-Friday  5 pm - 9 pm  Saturday and Sunday   9 am - 5 pm    Monday-Friday   11 am - 9 pm  Saturday and Sunday   9 am - 5 pm   [phone]       If you need a medication refill, please contact your pharmacy. Please allow 3 business days for your refill to be completed.  As always, Thank you for trusting us with your healthcare needs!

## 2019-02-20 ENCOUNTER — PRENATAL OFFICE VISIT (OUTPATIENT)
Dept: OBGYN | Facility: CLINIC | Age: 24
End: 2019-02-20
Payer: COMMERCIAL

## 2019-02-20 VITALS
TEMPERATURE: 98 F | SYSTOLIC BLOOD PRESSURE: 127 MMHG | WEIGHT: 201 LBS | DIASTOLIC BLOOD PRESSURE: 77 MMHG | HEART RATE: 81 BPM | BODY MASS INDEX: 33.45 KG/M2

## 2019-02-20 DIAGNOSIS — Z29.13 NEED FOR RHOGAM DUE TO RH NEGATIVE MOTHER: ICD-10-CM

## 2019-02-20 DIAGNOSIS — Z34.80 SUPERVISION OF OTHER NORMAL PREGNANCY, ANTEPARTUM: ICD-10-CM

## 2019-02-20 PROCEDURE — 87186 SC STD MICRODIL/AGAR DIL: CPT | Performed by: OBSTETRICS & GYNECOLOGY

## 2019-02-20 PROCEDURE — 87653 STREP B DNA AMP PROBE: CPT | Performed by: OBSTETRICS & GYNECOLOGY

## 2019-02-20 PROCEDURE — 99207 ZZC PRENATAL VISIT: CPT | Performed by: OBSTETRICS & GYNECOLOGY

## 2019-02-20 NOTE — PROGRESS NOTES
No signif signs, symptoms or concerns. Advice appropriate to gestational age reviewed including pertinent Checklist items. Discussed condition, tests and care plan including RBA. Problem list updated.   A/P:  Nayla was seen today for prenatal care.    Diagnoses and all orders for this visit:    Supervision of other normal pregnancy, antepartum  -     Group B strep PCR    Need for rhogam due to Rh negative mother        Ankur Ruiz MD

## 2019-02-21 LAB
GP B STREP DNA SPEC QL NAA+PROBE: POSITIVE
SPECIMEN SOURCE: ABNORMAL

## 2019-02-24 LAB
BACTERIA SPEC CULT: ABNORMAL
SPECIMEN SOURCE: ABNORMAL

## 2019-02-25 PROBLEM — O99.820 GBS (GROUP B STREPTOCOCCUS CARRIER), +RV CULTURE, CURRENTLY PREGNANT: Status: ACTIVE | Noted: 2019-02-25

## 2019-02-27 ENCOUNTER — PRENATAL OFFICE VISIT (OUTPATIENT)
Dept: OBGYN | Facility: CLINIC | Age: 24
End: 2019-02-27
Payer: COMMERCIAL

## 2019-02-27 VITALS
WEIGHT: 201 LBS | BODY MASS INDEX: 33.45 KG/M2 | TEMPERATURE: 98.3 F | DIASTOLIC BLOOD PRESSURE: 79 MMHG | HEART RATE: 84 BPM | SYSTOLIC BLOOD PRESSURE: 125 MMHG

## 2019-02-27 DIAGNOSIS — Z29.13 NEED FOR RHOGAM DUE TO RH NEGATIVE MOTHER: ICD-10-CM

## 2019-02-27 DIAGNOSIS — O99.820 GBS (GROUP B STREPTOCOCCUS CARRIER), +RV CULTURE, CURRENTLY PREGNANT: ICD-10-CM

## 2019-02-27 DIAGNOSIS — Z34.80 SUPERVISION OF OTHER NORMAL PREGNANCY, ANTEPARTUM: ICD-10-CM

## 2019-02-27 PROCEDURE — 99207 ZZC PRENATAL VISIT: CPT | Performed by: OBSTETRICS & GYNECOLOGY

## 2019-02-27 NOTE — PATIENT INSTRUCTIONS
If you have any questions regarding your visit, Please contact your care team.     [employer] Services: [phone]    [facility] Health CLINIC HOURS TELEPHONE NUMBER       KAREN Dyer-    Parminder Li-Medical Assistant       Monday-[place]  8:00a.m-4:45 p.m  Tuesday-[place]  9:00a.m-11:45 a.m  Wednesday-[place] 8:00a.m-4:45 p.m.  Thursday-[place]  8:00a.m-4:45 p.m.  Friday-[place]  8:00a.m-4:45 p.m. [facility]  [address] [place] [address]  [phone] ask [facility]  [phone] Fax  Imaging [phone]    [facility] Labor and Delivery  [address] [place] [address] [place] [address]  [phone]    [facility]  [address] [place]  [place] [address]  [phone] ask [facility]  [phone] Fax  Imaging [phone]     Urgent Care locations:    [facility] Monday-Friday  5 pm - 9 pm  Saturday and Sunday   9 am - 5 pm    Monday-Friday   11 am - 9 pm  Saturday and Sunday   9 am - 5 pm   [phone]       If you need a medication refill, please contact your pharmacy. Please allow 3 business days for your refill to be completed.  As always, Thank you for trusting us with your healthcare needs!

## 2019-02-27 NOTE — PROGRESS NOTES
No signif signs, symptoms or concerns except mild URI. Wants to avoid induction if able. Advice appropriate to gestational age reviewed including pertinent Checklist items. Discussed condition, tests and care plan including RBA. Problem list updated. Check cervix next.  A/P:  Nayla was seen today for prenatal care.    Diagnoses and all orders for this visit:    GBS (group B Streptococcus carrier), +RV culture, currently pregnant    Supervision of other normal pregnancy, antepartum    Need for rhogam due to Rh negative mother        Ankur Ruiz MD

## 2019-03-08 ENCOUNTER — PRENATAL OFFICE VISIT (OUTPATIENT)
Dept: OBGYN | Facility: CLINIC | Age: 24
End: 2019-03-08
Payer: COMMERCIAL

## 2019-03-08 VITALS
WEIGHT: 202 LBS | SYSTOLIC BLOOD PRESSURE: 127 MMHG | DIASTOLIC BLOOD PRESSURE: 77 MMHG | TEMPERATURE: 98.3 F | HEART RATE: 96 BPM | BODY MASS INDEX: 33.61 KG/M2

## 2019-03-08 DIAGNOSIS — Z29.13 NEED FOR RHOGAM DUE TO RH NEGATIVE MOTHER: ICD-10-CM

## 2019-03-08 DIAGNOSIS — Z34.80 SUPERVISION OF OTHER NORMAL PREGNANCY, ANTEPARTUM: ICD-10-CM

## 2019-03-08 DIAGNOSIS — O99.820 GBS (GROUP B STREPTOCOCCUS CARRIER), +RV CULTURE, CURRENTLY PREGNANT: ICD-10-CM

## 2019-03-08 PROCEDURE — 99207 ZZC PRENATAL VISIT: CPT | Performed by: OBSTETRICS & GYNECOLOGY

## 2019-03-08 NOTE — PROGRESS NOTES
No signif signs, symptoms or concerns. Advice appropriate to gestational age reviewed including pertinent Checklist items. Discussed condition, tests and care plan including RBA. Problem list updated. Check cervix next.  A/P:  Nayla was seen today for prenatal care.    Diagnoses and all orders for this visit:    GBS (group B Streptococcus carrier), +RV culture, currently pregnant    Supervision of other normal pregnancy, antepartum    Need for rhogam due to Rh negative mother        Ankur Ruiz MD

## 2019-03-08 NOTE — PATIENT INSTRUCTIONS
If you have any questions regarding your visit, Please contact your care team.     [employer] Services: [phone]  [facility] Health CLINIC HOURS TELEPHONE NUMBER       Ankur Ruiz M.D.        Courtney-    RN-      Omni-Medical Assistant       [place] [place]  8:00a.m-4:45 p.m  Tuesday-[place]  9:00a.m-4:00 p.m  Wednesday-[place] 8:00a.m-4:45 p.m.  Thursday-[place]  8:00a.m-4:45 p.m.  Friday-[place]  8:00a.m-4:45 p.m. [facility]  [address]  [place], [place] [address] [phone] ask [facility]  [phone] Fax  Imaging [phone]    [facility] Labor and Delivery  [address] [place] [address] Dr.  [place], [place] [address] [phone]    [facility]  [address] [place] [address]  [phone] ask [facility]  [phone] Fax  Imaging [phone]     Urgent Care locations:    [place]        [place] Monday-Friday  5 pm - 9 pm  Saturday and Sunday   9 am - 5 pm  Monday-Friday   11 am - 9 pm  Saturday and Sunday   9 am - 5 pm   [phone]   If you need a medication refill, please contact your pharmacy. Please allow 3 business days for your refill to be completed.  As always, Thank you for trusting us with your healthcare needs!

## 2019-03-12 ENCOUNTER — NURSE TRIAGE (OUTPATIENT)
Dept: NURSING | Facility: CLINIC | Age: 24
End: 2019-03-12

## 2019-03-12 NOTE — TELEPHONE ENCOUNTER
"Caller is pregnant and water broke an half hour ago. Caller's MARK is 03/10/19. No severe abdominal pain noted. Triage guidelines recommend to go to labor and delivery. Caller verbalized and understands directives.    Reason for Disposition    Leakage of fluid from vagina    Additional Information    Negative: [1] SEVERE abdominal pain (e.g., excruciating) AND [2] constant AND [3] present > 1 hour    Negative: Severe bleeding (e.g., continuous red blood from vagina, or large blood clots)    Negative: Umbilical cord hanging out of the vagina (shiny, white, curled appearance, \"like telephone cord\")    Negative: Uncontrollable urge to push (i.e., feels like baby is coming out now)    Negative: Can see baby    Negative: Sounds like a life-threatening emergency to the triager    Negative: < 20 weeks pregnant    Negative: Vaginal bleeding    Protocols used: PREGNANCY - RUPTURE OF MEMBRANES-ADULT-AH      "

## 2019-04-24 ENCOUNTER — TELEPHONE (OUTPATIENT)
Dept: OBGYN | Facility: CLINIC | Age: 24
End: 2019-04-24

## 2019-04-24 ENCOUNTER — PRENATAL OFFICE VISIT (OUTPATIENT)
Dept: OBGYN | Facility: CLINIC | Age: 24
End: 2019-04-24
Payer: COMMERCIAL

## 2019-04-24 VITALS
HEART RATE: 67 BPM | OXYGEN SATURATION: 97 % | DIASTOLIC BLOOD PRESSURE: 70 MMHG | WEIGHT: 194.6 LBS | SYSTOLIC BLOOD PRESSURE: 104 MMHG | BODY MASS INDEX: 32.38 KG/M2

## 2019-04-24 DIAGNOSIS — B35.4 DERMATOPHYTOSIS OF BODY: ICD-10-CM

## 2019-04-24 PROBLEM — Z34.80 SUPERVISION OF OTHER NORMAL PREGNANCY, ANTEPARTUM: Status: RESOLVED | Noted: 2018-08-22 | Resolved: 2019-04-24

## 2019-04-24 PROBLEM — Z29.13 NEED FOR RHOGAM DUE TO RH NEGATIVE MOTHER: Status: RESOLVED | Noted: 2018-08-22 | Resolved: 2019-04-24

## 2019-04-24 PROBLEM — O99.820 GBS (GROUP B STREPTOCOCCUS CARRIER), +RV CULTURE, CURRENTLY PREGNANT: Status: RESOLVED | Noted: 2019-02-25 | Resolved: 2019-04-24

## 2019-04-24 PROCEDURE — 99207 ZZC POST PARTUM EXAM: CPT | Performed by: OBSTETRICS & GYNECOLOGY

## 2019-04-24 PROCEDURE — 99212 OFFICE O/P EST SF 10 MIN: CPT | Mod: 24 | Performed by: OBSTETRICS & GYNECOLOGY

## 2019-04-24 RX ORDER — KETOCONAZOLE 20 MG/ML
SHAMPOO TOPICAL
Qty: 120 ML | Refills: 1 | Status: SHIPPED | OUTPATIENT
Start: 2019-04-24 | End: 2020-12-16

## 2019-04-24 ASSESSMENT — ANXIETY QUESTIONNAIRES
3. WORRYING TOO MUCH ABOUT DIFFERENT THINGS: NOT AT ALL
2. NOT BEING ABLE TO STOP OR CONTROL WORRYING: NOT AT ALL
1. FEELING NERVOUS, ANXIOUS, OR ON EDGE: NOT AT ALL
7. FEELING AFRAID AS IF SOMETHING AWFUL MIGHT HAPPEN: NOT AT ALL
GAD7 TOTAL SCORE: 0
5. BEING SO RESTLESS THAT IT IS HARD TO SIT STILL: NOT AT ALL
6. BECOMING EASILY ANNOYED OR IRRITABLE: NOT AT ALL

## 2019-04-24 ASSESSMENT — PATIENT HEALTH QUESTIONNAIRE - PHQ9
5. POOR APPETITE OR OVEREATING: NOT AT ALL
SUM OF ALL RESPONSES TO PHQ QUESTIONS 1-9: 0

## 2019-04-24 NOTE — TELEPHONE ENCOUNTER
Notified [facility] pharmacy. Prescription will be filled.     Elda Herrera RN on 4/24/2019 at 1:25 PM

## 2019-04-24 NOTE — PROGRESS NOTES
Nayla Mcmanus is a 23 year old year old G 2 P 2 who is here today for a postpartum exam.    HPI:      Doing well and without signif sx or concerns except some prolonged lochia and a recurrent dermatophyte rash on neck. Also did have apparent rash after IV Ampicillin for GBS intrapartum antibiotic prophylaxis (IV antibiotic in labor). Currently breast feeding infant. Here today alone. Infant doing well. Contraceptive method planned is condoms. NSA since delivery. PP depression screening as noted. See PHQ-9. Score = 0.    Past medical, obstetrical, surgical, family and social history reviewed and as noted or updated in chart.     Allergies, meds and supplements are as noted or updated in chart.      ROS:     Systems reviewed include constitutional; breast;                 cardiac; respiratory; gastrointestinal; genitourinary;                                musculoskeletal; integumentary; psychological;                                hematologic/lymphatic and endocrine.                  These systems were negative for significant symptoms except                 for the following: none and see HPI.                                Exam:  VS as noted.                    Abd was                             normal or negative except for, or in particular noting, the following                pertinent findings: none.       Assessment: Postpartum exam    Plan and Recommendations: Symptoms, problems and concerns reviewed.  Discussed pregnancy, birth, future pregnancy plans, work plans and infant care issues.  Problem list updated and Pregnancy Episode closed. See orders. RTC in 12 months.    Nayla was seen today for postpartum care.    Diagnoses and all orders for this visit:    Routine postpartum follow-up    Dermatophytosis of body  -     ketoconazole (NIZORAL) 2 % external shampoo; Apply to the affected area and wash off after 5 minutes.        Ankur Ruiz MD

## 2019-04-24 NOTE — PATIENT INSTRUCTIONS
If you have any questions regarding your visit, Please contact your care team.     [place] [employer] Services: [phone]  [facility] CLINIC HOURS TELEPHONE NUMBER       Ankur Ruiz M.D.        Courtney-    RN-      Omni-Medical Assistant       [facility]  8:00a.m-4:45 p.m  Tuesday-[place]  9:00a.m-4:00 p.m  Wednesday-[place] 8:00a.m-4:45 p.m.  Thursday-[place]  8:00a.m-4:45 p.m.  Friday-[place]  8:00a.m-4:45 p.m. [facility]  [address]  [place], [place] [address] [phone] ask [facility]  [phone] Fax  Imaging [phone]    [facility] Labor and Delivery  [address] [place] [address] Dr.  [place], [place] [address] [phone]    [facility]  [address] [place] [address]  [phone] ask [facility]  [phone] Fax  Imaging Pnltttjqjf-[phone]     Urgent Care locations:    [place]        [place] Monday-Friday  5 pm - 9 pm  Saturday and Sunday   9 am - 5 pm  Monday-Friday   11 am - 9 pm  Saturday and Sunday   9 am - 5 pm   [phone]   If you need a medication refill, please contact your pharmacy. Please allow 3 business days for your refill to be completed.  As always, Thank you for trusting us with your healthcare needs!

## 2019-04-24 NOTE — TELEPHONE ENCOUNTER
Reason for Call:  Other     Detailed comments: Pharmacy needs day/week frequency of ketoconazole (NIZORAL) 2 % external shampoo.    Phone Number Patient can be reached at: [facility] [phone]    Best Time: any    Can we leave a detailed message on this number? YES    Call taken on 4/24/2019 at 10:03 AM by Thania Perrin

## 2019-04-25 ASSESSMENT — ANXIETY QUESTIONNAIRES: GAD7 TOTAL SCORE: 0

## 2019-10-01 PROBLEM — O35.00X0 MATERNAL CARE FOR (SUSPECTED) CENTRAL NERVOUS SYSTEM MALFORMATION IN FETUS, NOT APPLICABLE OR UNSPECIFIED: Status: RESOLVED | Noted: 2018-10-26 | Resolved: 2019-04-24

## 2019-11-08 ENCOUNTER — HEALTH MAINTENANCE LETTER (OUTPATIENT)
Age: 24
End: 2019-11-08

## 2020-12-06 ENCOUNTER — HEALTH MAINTENANCE LETTER (OUTPATIENT)
Age: 25
End: 2020-12-06

## 2020-12-16 ENCOUNTER — ANCILLARY PROCEDURE (OUTPATIENT)
Dept: ULTRASOUND IMAGING | Facility: CLINIC | Age: 25
End: 2020-12-16
Attending: OBSTETRICS & GYNECOLOGY
Payer: COMMERCIAL

## 2020-12-16 ENCOUNTER — PRENATAL OFFICE VISIT (OUTPATIENT)
Dept: OBGYN | Facility: CLINIC | Age: 25
End: 2020-12-16
Payer: COMMERCIAL

## 2020-12-16 VITALS
OXYGEN SATURATION: 99 % | HEART RATE: 97 BPM | BODY MASS INDEX: 27.79 KG/M2 | WEIGHT: 167 LBS | DIASTOLIC BLOOD PRESSURE: 76 MMHG | SYSTOLIC BLOOD PRESSURE: 129 MMHG

## 2020-12-16 DIAGNOSIS — Z34.80 SUPERVISION OF OTHER NORMAL PREGNANCY, ANTEPARTUM: ICD-10-CM

## 2020-12-16 DIAGNOSIS — Z29.13 NEED FOR RHOGAM DUE TO RH NEGATIVE MOTHER: ICD-10-CM

## 2020-12-16 DIAGNOSIS — O26.859 SPOTTING IN EARLY PREGNANCY: ICD-10-CM

## 2020-12-16 DIAGNOSIS — Z34.80 SUPERVISION OF OTHER NORMAL PREGNANCY, ANTEPARTUM: Primary | ICD-10-CM

## 2020-12-16 LAB
ABO + RH BLD: NORMAL
ABO + RH BLD: NORMAL
BLD GP AB SCN SERPL QL: NORMAL
BLOOD BANK CMNT PATIENT-IMP: NORMAL
ERYTHROCYTE [DISTWIDTH] IN BLOOD BY AUTOMATED COUNT: 12.7 % (ref 10–15)
HCT VFR BLD AUTO: 38.6 % (ref 35–47)
HGB BLD-MCNC: 12.9 G/DL (ref 11.7–15.7)
MCH RBC QN AUTO: 27.8 PG (ref 26.5–33)
MCHC RBC AUTO-ENTMCNC: 33.4 G/DL (ref 31.5–36.5)
MCV RBC AUTO: 83 FL (ref 78–100)
PLATELET # BLD AUTO: 231 10E9/L (ref 150–450)
RBC # BLD AUTO: 4.64 10E12/L (ref 3.8–5.2)
SPECIMEN EXP DATE BLD: NORMAL
WBC # BLD AUTO: 7.7 10E9/L (ref 4–11)

## 2020-12-16 PROCEDURE — 86900 BLOOD TYPING SEROLOGIC ABO: CPT | Performed by: OBSTETRICS & GYNECOLOGY

## 2020-12-16 PROCEDURE — 87389 HIV-1 AG W/HIV-1&-2 AB AG IA: CPT | Performed by: OBSTETRICS & GYNECOLOGY

## 2020-12-16 PROCEDURE — 85027 COMPLETE CBC AUTOMATED: CPT | Performed by: OBSTETRICS & GYNECOLOGY

## 2020-12-16 PROCEDURE — 86901 BLOOD TYPING SEROLOGIC RH(D): CPT | Performed by: OBSTETRICS & GYNECOLOGY

## 2020-12-16 PROCEDURE — 86780 TREPONEMA PALLIDUM: CPT | Mod: 90 | Performed by: OBSTETRICS & GYNECOLOGY

## 2020-12-16 PROCEDURE — 99000 SPECIMEN HANDLING OFFICE-LAB: CPT | Performed by: OBSTETRICS & GYNECOLOGY

## 2020-12-16 PROCEDURE — 87340 HEPATITIS B SURFACE AG IA: CPT | Performed by: OBSTETRICS & GYNECOLOGY

## 2020-12-16 PROCEDURE — 86850 RBC ANTIBODY SCREEN: CPT | Performed by: OBSTETRICS & GYNECOLOGY

## 2020-12-16 PROCEDURE — 99207 PR FIRST OB VISIT: CPT | Performed by: OBSTETRICS & GYNECOLOGY

## 2020-12-16 PROCEDURE — 86762 RUBELLA ANTIBODY: CPT | Performed by: OBSTETRICS & GYNECOLOGY

## 2020-12-16 PROCEDURE — 87086 URINE CULTURE/COLONY COUNT: CPT | Performed by: OBSTETRICS & GYNECOLOGY

## 2020-12-16 PROCEDURE — 36415 COLL VENOUS BLD VENIPUNCTURE: CPT | Performed by: OBSTETRICS & GYNECOLOGY

## 2020-12-16 NOTE — PATIENT INSTRUCTIONS
If you have any questions regarding your visit, Please contact your care team.     [employer] Services: [phone]  Women s Health CLINIC HOURS TELEPHONE NUMBER       Ankur Ruiz M.D.    Ruth-MARGARET Corbett-MARGARET Li-Medical Assistant    Monie-  Erica-     Monday-[place]  8:00a.m-4:45 p.m  Tuesday-[place]  9:00a.m-4:00 p.m  Wednesday-[place] 8:00a.m-4:45 p.m.  Thursday-[place]  8:00a.m-4:45 p.m.  Friday-[place]  8:00a.m-4:45 p.m. [facility]  [address] [place] [address] [phone] ask for [facility]  [phone] Fax  Imaging [phone]    [facility]  [address]  [place], [place] [address] [phone]    [facility]  [address] [place]  [place] [address] [phone] ask [facility]  [phone] Fax  Imaging [phone]     Urgent Care locations:    [place]        [place] Monday-Friday  5 pm - 9 pm  Saturday and Sunday   9 am - 5 pm  Monday-Friday   11 am - 9 pm  Saturday and Sunday   9 am - 5 pm   [phone]   If you need a medication refill, please contact your pharmacy. Please allow 3 business days for your refill to be completed.  As always, Thank you for trusting us with your healthcare needs!

## 2020-12-17 LAB
HBV SURFACE AG SERPL QL IA: NONREACTIVE
HIV 1+2 AB+HIV1 P24 AG SERPL QL IA: NONREACTIVE
RUBV IGG SERPL IA-ACNC: 46 IU/ML
T PALLIDUM AB SER QL: NONREACTIVE

## 2020-12-17 NOTE — PROGRESS NOTES
Nayla Mcmanus is a 24 year old year old G 4 P 2 who presents for an initial obstetrical visit.  Referred by self.    Currently experiencing normal pregnancy symptoms without particular problems including pain, bleeding, marked vomiting or weight loss except: mild spotting and nausea.  This was a planned pregnancy. Menses regular and certain but ovulated later.  Here today alone.   Additional concerns: Rh neg, history of OP deliveries, history of SAB x 1.   Family doing well.     ROS:     Systems reviewed include constitutional; breast;                 cardiac; respiratory; gastrointestinal; genitourinary;                                musculoskeletal; integumentary; psychological;                                hematologic/lymphatic and endocrine.                  These systems were negative for significant symptoms except                 for the following: none and see above HPI.    Past medical, obstetrical, surgical, family and social history reviewed and as noted or updated in chart.     Allergies, meds and supplements are as noted or updated in chart.                    Episode OB dating, demographic and history forms completed or reviewed.    EXAM:  VS as noted. BMI- Body mass index is 27.79 kg/m .    Relatively recent normal general exam- not repeated now.       Nayla was seen today for prenatal care.    Diagnoses and all orders for this visit:    Supervision of other normal pregnancy, antepartum  -     ABO/Rh type and screen  -     CBC with platelets  -     Hepatitis B surface antigen  -     Rubella Antibody IgG Quantitative  -     Urine Culture Aerobic Bacterial  -     HIV Antigen Antibody Combo  -     Treponema Abs w Reflex to RPR and Titer  -     US OB < 14 Weeks Single; Future    Spotting in early pregnancy  -     US OB < 14 Weeks Single; Future    Need for rhogam due to Rh negative mother        PLAN: Total encounter time (physician together with patient) = 30min. Direct counseling, education and  care coordination time (physician together with patient) = 20min.This counseling included the following: Advice appropriate to gestational age reviewed including pertinent Checklist items. Discussed condition, tests and general care plan. Symptoms, problems and concerns reviewed. Recommended weight gain discussed. Problem list initiated.   Check ultrasound now. Pap due now and patient prefers to defer to postpartum. Orders as noted. RTC in 4 weeks. Discuss special screening tests next.    Ankur Ruiz MD

## 2020-12-18 LAB
BACTERIA SPEC CULT: NO GROWTH
Lab: NORMAL
SPECIMEN SOURCE: NORMAL

## 2021-01-15 ENCOUNTER — HEALTH MAINTENANCE LETTER (OUTPATIENT)
Age: 26
End: 2021-01-15

## 2021-01-21 ENCOUNTER — PRENATAL OFFICE VISIT (OUTPATIENT)
Dept: OBGYN | Facility: CLINIC | Age: 26
End: 2021-01-21
Payer: COMMERCIAL

## 2021-01-21 VITALS
HEART RATE: 86 BPM | SYSTOLIC BLOOD PRESSURE: 112 MMHG | WEIGHT: 169 LBS | OXYGEN SATURATION: 98 % | DIASTOLIC BLOOD PRESSURE: 75 MMHG | BODY MASS INDEX: 28.12 KG/M2

## 2021-01-21 DIAGNOSIS — Z34.80 SUPERVISION OF OTHER NORMAL PREGNANCY, ANTEPARTUM: ICD-10-CM

## 2021-01-21 PROCEDURE — 99207 PR PRENATAL VISIT: CPT | Performed by: OBSTETRICS & GYNECOLOGY

## 2021-01-21 NOTE — PROGRESS NOTES
No significant signs, symptoms or concerns. Did a Sneak Peek blood test and is expecting a boy.   Discussed special diagnostic and screening tests and plans (y = yes, n = no/declined, p = possibly/considering, d = done already, na = not applicable or past time): first trimester scr with NT and later AFP or with cell free DNA and later AFP = n, cell free DNA= n, CF carrier scr = n, hemoglobinopathy scr= n, SMA, fragile X  and other genetic scr= n, genetic amnio/CVS = n, quad scr = n, survey u/s = p, Level 2 survey u/s with MFM = n.  Counseling included the following: Advice appropriate to gestational age reviewed including pertinent Checklist items. Discussed condition, tests and care plan including risks, benefits, and alternatives. Problem list updated.   20 minutes spent on the date of encounter doing chart review, history, examination, discussion with patient, and documentation, and further activities as noted above, and review of appropriateness of decision-making for care, and review of test results, and interpretation of test results.    A/P:  Nayla was seen today for prenatal care.    Diagnoses and all orders for this visit:    Supervision of other normal pregnancy, antepartum        Ankur Ruiz MD

## 2021-01-21 NOTE — PATIENT INSTRUCTIONS
If you have any questions regarding your visit, Please contact your care team.     [employer] Services: [phone]  Women s Health CLINIC HOURS TELEPHONE NUMBER       Ankur Ruiz M.D.    Ruth-MARGARET Corbett-MARGARET Li-Medical Assistant    Monie-  Erica-     Monday-[place]  8:00a.m-4:45 p.m  Tuesday-[place]  9:00a.m-4:00 p.m  Wednesday-[place] 8:00a.m-4:45 p.m.  Thursday-[place]  8:00a.m-4:45 p.m.  Friday-[place]  8:00a.m-4:45 p.m. [facility]  [address] [facility] [address] [place] [address] [phone] ask for [facility]  [phone] Fax  Imaging [phone]    [facility] Labor [facility] Delivery  [address]  [place], [place] [address] [phone]    [facility]  [address] [place]  [place] [address] [phone] ask [facility]  [phone] Fax  Imaging [phone]     Urgent Care locations:    [place]        [place] Monday-Friday  5 pm - 9 pm  Saturday and Sunday   9 am - 5 pm  Monday-Friday   11 am - 9 pm  Saturday and Sunday   9 am - 5 pm   [phone]   If you need a medication refill, please contact your pharmacy. Please allow 3 business days for your refill to be completed.  As always, Thank you for trusting us with your healthcare needs!

## 2021-02-24 ENCOUNTER — PRENATAL OFFICE VISIT (OUTPATIENT)
Dept: OBGYN | Facility: CLINIC | Age: 26
End: 2021-02-24
Payer: COMMERCIAL

## 2021-02-24 ENCOUNTER — ANCILLARY PROCEDURE (OUTPATIENT)
Dept: ULTRASOUND IMAGING | Facility: OTHER | Age: 26
End: 2021-02-24
Attending: OBSTETRICS & GYNECOLOGY
Payer: COMMERCIAL

## 2021-02-24 VITALS
BODY MASS INDEX: 29.12 KG/M2 | DIASTOLIC BLOOD PRESSURE: 73 MMHG | HEART RATE: 85 BPM | WEIGHT: 175 LBS | OXYGEN SATURATION: 100 % | SYSTOLIC BLOOD PRESSURE: 118 MMHG

## 2021-02-24 DIAGNOSIS — Z34.80 SUPERVISION OF OTHER NORMAL PREGNANCY, ANTEPARTUM: ICD-10-CM

## 2021-02-24 PROCEDURE — 99207 PR PRENATAL VISIT: CPT | Performed by: OBSTETRICS & GYNECOLOGY

## 2021-02-24 NOTE — PATIENT INSTRUCTIONS
If you have any questions regarding your visit, Please contact your care team.     [employer] Services: [phone]  Women s Health CLINIC HOURS TELEPHONE NUMBER       Ankur Ruiz M.D.    Ruth-MARGARET Corbett-MARGARET Li-Medical Assistant    Monie-  Erica-     Monday-[place]  8:00a.m-4:45 p.m  Tuesday-[place]  9:00a.m-4:00 p.m  Wednesday-[place] 8:00a.m-4:45 p.m.  Thursday-[place]  8:00a.m-4:45 p.m.  Friday-[place]  8:00a.m-4:45 p.m. [facility]  [address] [facility] [place] [address] [phone] ask for [facility]  [phone] Fax  Imaging [phone]    [facility]  [address]  [place], [place] [address] [phone]    [facility]  [address] [place]  [place] [address] [phone] ask [facility]  [phone] Fax  Imaging [phone]     Urgent Care locations:    [place]        [place] Monday-Friday  5 pm - 9 pm  Saturday and Sunday   9 am - 5 pm  Monday-Friday   11 am - 9 pm  Saturday and Sunday   9 am - 5 pm   [phone]   If you need a medication refill, please contact your pharmacy. Please allow 3 business days for your refill to be completed.  As always, Thank you for trusting us with your healthcare needs!

## 2021-02-24 NOTE — PROGRESS NOTES
No significant signs, symptoms or concerns. Some am nausea and vomiting still but managing. Desires to forego the 1h GTT again and follow for glucosuria.    Counseling included the following: Advice appropriate to gestational age reviewed including pertinent Checklist items. Discussed condition, tests and care plan including risks, benefits, and alternatives. Problem list updated.   20 minutes spent on the date of encounter doing chart review, history, examination, discussion with patient, and documentation, and further activities as noted, and review of appropriateness of decision-making for care.  A/P:  Nayla was seen today for prenatal care.    Diagnoses and all orders for this visit:    Supervision of other normal pregnancy, antepartum  -     US OB > 14 Weeks; Future        Ankur Ruiz MD

## 2021-03-30 ENCOUNTER — PRENATAL OFFICE VISIT (OUTPATIENT)
Dept: OBGYN | Facility: CLINIC | Age: 26
End: 2021-03-30
Payer: COMMERCIAL

## 2021-03-30 VITALS
HEART RATE: 88 BPM | WEIGHT: 183 LBS | DIASTOLIC BLOOD PRESSURE: 90 MMHG | BODY MASS INDEX: 30.45 KG/M2 | SYSTOLIC BLOOD PRESSURE: 119 MMHG

## 2021-03-30 DIAGNOSIS — Z34.80 SUPERVISION OF OTHER NORMAL PREGNANCY, ANTEPARTUM: Primary | ICD-10-CM

## 2021-03-30 LAB
ALBUMIN UR-MCNC: NEGATIVE MG/DL
APPEARANCE UR: CLEAR
BILIRUB UR QL STRIP: NEGATIVE
COLOR UR AUTO: YELLOW
GLUCOSE UR STRIP-MCNC: NEGATIVE MG/DL
HGB UR QL STRIP: NEGATIVE
KETONES UR STRIP-MCNC: NEGATIVE MG/DL
LEUKOCYTE ESTERASE UR QL STRIP: NEGATIVE
NITRATE UR QL: NEGATIVE
PH UR STRIP: 7 PH (ref 5–7)
SOURCE: NORMAL
SP GR UR STRIP: 1.02 (ref 1–1.03)
UROBILINOGEN UR STRIP-ACNC: 0.2 EU/DL (ref 0.2–1)

## 2021-03-30 PROCEDURE — 81003 URINALYSIS AUTO W/O SCOPE: CPT | Performed by: OBSTETRICS & GYNECOLOGY

## 2021-03-30 PROCEDURE — 99207 PR PRENATAL VISIT: CPT | Performed by: OBSTETRICS & GYNECOLOGY

## 2021-03-30 NOTE — PATIENT INSTRUCTIONS
If you have any questions regarding your visit, Please contact your care team.     [employer] Services: [phone]  Women s Health CLINIC HOURS TELEPHONE NUMBER       Ankur Ruiz M.D.    Ruth-MARGARET Corbett-MARGARET Li-Medical Assistant    Monie-  Erica-     Monday-[place]  8:00a.m-4:45 p.m  Tuesday-[place]  9:00a.m-4:00 p.m  Wednesday-[place] 8:00a.m-4:45 p.m.  Thursday-[place]  8:00a.m-4:45 p.m.  Friday-[place]  8:00a.m-4:45 p.m. [facility]  [address] [place] [address] [phone] ask for [facility]  [phone] Fax  Imaging [phone]    [facility] Labor [facility] Delivery  [address]  [place], [place] [address] [phone]    [facility]  [address] [place]  [place] [address] [phone] ask [facility]  [phone] Fax  Imaging [phone]     Urgent Care locations:    [place]        [place] Monday-Friday  5 pm - 9 pm  Saturday and Sunday   9 am - 5 pm  Monday-Friday   11 am - 9 pm  Saturday and Sunday   9 am - 5 pm   [phone]   If you need a medication refill, please contact your pharmacy. Please allow 3 business days for your refill to be completed.  As always, Thank you for trusting us with your healthcare needs!

## 2021-03-30 NOTE — PROGRESS NOTES
No significant signs, symptoms or concerns. Discussed survey ultrasound and will recheck if indicated based on fetal growth. Weight noted. [place] road trip soon- precautions.    Counseling included the following: Advice appropriate to gestational age reviewed including pertinent Checklist items. Discussed condition, tests and care plan including risks, benefits, and alternatives. Problem list updated.   20 minutes spent on the date of encounter doing chart review, history, examination, discussion with patient, and documentation, and further activities as noted, and review of appropriateness of decision-making for care, and review of test results, and interpretation of test results.  Ab screening and Rhogam and UA next.  A/P:  Nayla was seen today for prenatal care.    Diagnoses and all orders for this visit:    Supervision of other normal pregnancy, antepartum  -     UA reflex to Microscopic        Ankur Ruiz MD

## 2021-05-12 ENCOUNTER — PRENATAL OFFICE VISIT (OUTPATIENT)
Dept: OBGYN | Facility: CLINIC | Age: 26
End: 2021-05-12
Payer: COMMERCIAL

## 2021-05-12 VITALS
SYSTOLIC BLOOD PRESSURE: 127 MMHG | OXYGEN SATURATION: 99 % | WEIGHT: 192 LBS | BODY MASS INDEX: 31.95 KG/M2 | HEART RATE: 95 BPM | DIASTOLIC BLOOD PRESSURE: 82 MMHG

## 2021-05-12 DIAGNOSIS — Z34.80 SUPERVISION OF OTHER NORMAL PREGNANCY, ANTEPARTUM: ICD-10-CM

## 2021-05-12 DIAGNOSIS — Z29.13 NEED FOR RHOGAM DUE TO RH NEGATIVE MOTHER: Primary | ICD-10-CM

## 2021-05-12 LAB
ALBUMIN UR-MCNC: NEGATIVE MG/DL
APPEARANCE UR: CLEAR
BILIRUB UR QL STRIP: NEGATIVE
BLD GP AB SCN SERPL QL: NORMAL
COLOR UR AUTO: YELLOW
GLUCOSE UR STRIP-MCNC: NEGATIVE MG/DL
HGB BLD-MCNC: 11.7 G/DL (ref 11.7–15.7)
HGB UR QL STRIP: NEGATIVE
KETONES UR STRIP-MCNC: NEGATIVE MG/DL
LEUKOCYTE ESTERASE UR QL STRIP: ABNORMAL
NITRATE UR QL: NEGATIVE
PH UR STRIP: 7 PH (ref 5–7)
SOURCE: ABNORMAL
SP GR UR STRIP: 1.02 (ref 1–1.03)
UROBILINOGEN UR STRIP-ACNC: 0.2 EU/DL (ref 0.2–1)

## 2021-05-12 PROCEDURE — 81003 URINALYSIS AUTO W/O SCOPE: CPT | Performed by: OBSTETRICS & GYNECOLOGY

## 2021-05-12 PROCEDURE — 85018 HEMOGLOBIN: CPT | Performed by: OBSTETRICS & GYNECOLOGY

## 2021-05-12 PROCEDURE — 36415 COLL VENOUS BLD VENIPUNCTURE: CPT | Performed by: OBSTETRICS & GYNECOLOGY

## 2021-05-12 PROCEDURE — 96372 THER/PROPH/DIAG INJ SC/IM: CPT | Performed by: OBSTETRICS & GYNECOLOGY

## 2021-05-12 PROCEDURE — 99207 PR PRENATAL VISIT: CPT | Performed by: OBSTETRICS & GYNECOLOGY

## 2021-05-12 PROCEDURE — 86850 RBC ANTIBODY SCREEN: CPT | Performed by: OBSTETRICS & GYNECOLOGY

## 2021-05-12 NOTE — PATIENT INSTRUCTIONS
If you have any questions regarding your visit, Please contact your care team.     [email or web address] Services: [phone]  Women s Health CLINIC HOURS TELEPHONE NUMBER       Ankur Ruiz M.D.    Ruth-MARGARET Corbett-MARGARET Li-Medical Assistant    Monie-  Ercia-     Monday-[place]  8:00a.m-4:45 p.m  Tuesday-[place]  9:00a.m-4:00 p.m  Wednesday-[place] 8:00a.m-4:45 p.m.  Thursday-[place]  8:00a.m-4:45 p.m.  Friday-[place]  8:00a.m-4:45 p.m. [facility]  [address] [facility] [address] [facility] [place] [address] [phone] ask for [facility]  [phone] Fax  Imaging [phone]    [facility] Labor [facility] Delivery  [address]  [place], [place] [address] [phone]    [facility]  [address] [place]  [place] [address] [phone] ask [facility]  [phone] Fax  Imaging [phone]     Urgent Care locations:    [place]        [place] Monday-Friday  5 pm - 9 pm  Saturday and Sunday   9 am - 5 pm  Monday-Friday   11 am - 9 pm  Saturday and Sunday   9 am - 5 pm   [phone]   If you need a medication refill, please contact your pharmacy. Please allow 3 business days for your refill to be completed.  As always, Thank you for trusting us with your healthcare needs!

## 2021-05-13 NOTE — PROGRESS NOTES
No significant signs, symptoms or concerns. [place] trip went well. Home BP normal. Rhogam given.    Counseling included the following: Advice appropriate to gestational age reviewed including pertinent Checklist items. Discussed condition, tests and care plan including risks, benefits, and alternatives. Problem list updated.   20 minutes spent on the date of encounter doing chart review, history, examination, discussion with patient, and documentation, and further activities as noted, and review of appropriateness of decision-making for care, and review of test results, and interpretation of test results.  A/P:  Nayla was seen today for prenatal care.    Diagnoses and all orders for this visit:    Need for rhogam due to Rh negative mother  -     rho(D) immune globulin (RHOGAM) injection 300 mcg    Supervision of other normal pregnancy, antepartum  -     Antibody screen red cell  -     Hemoglobin  -     UA without Microscopic        Ankur Ruiz MD

## 2021-06-03 ENCOUNTER — PRENATAL OFFICE VISIT (OUTPATIENT)
Dept: OBGYN | Facility: CLINIC | Age: 26
End: 2021-06-03
Payer: COMMERCIAL

## 2021-06-03 VITALS
SYSTOLIC BLOOD PRESSURE: 124 MMHG | BODY MASS INDEX: 32.95 KG/M2 | WEIGHT: 198 LBS | HEART RATE: 101 BPM | DIASTOLIC BLOOD PRESSURE: 79 MMHG | OXYGEN SATURATION: 99 %

## 2021-06-03 DIAGNOSIS — Z34.80 SUPERVISION OF OTHER NORMAL PREGNANCY, ANTEPARTUM: ICD-10-CM

## 2021-06-03 LAB
ALBUMIN UR-MCNC: NEGATIVE MG/DL
APPEARANCE UR: CLEAR
BILIRUB UR QL STRIP: NEGATIVE
COLOR UR AUTO: YELLOW
GLUCOSE UR STRIP-MCNC: NEGATIVE MG/DL
HGB UR QL STRIP: NEGATIVE
KETONES UR STRIP-MCNC: NEGATIVE MG/DL
LEUKOCYTE ESTERASE UR QL STRIP: ABNORMAL
NITRATE UR QL: NEGATIVE
PH UR STRIP: 8 PH (ref 5–7)
SOURCE: ABNORMAL
SP GR UR STRIP: 1.02 (ref 1–1.03)
UROBILINOGEN UR STRIP-ACNC: 0.2 EU/DL (ref 0.2–1)

## 2021-06-03 PROCEDURE — 81003 URINALYSIS AUTO W/O SCOPE: CPT | Performed by: OBSTETRICS & GYNECOLOGY

## 2021-06-03 PROCEDURE — 99207 PR PRENATAL VISIT: CPT | Performed by: OBSTETRICS & GYNECOLOGY

## 2021-06-03 NOTE — PATIENT INSTRUCTIONS
If you have any questions regarding your visit, Please contact your care team.     [employer] Services: [phone]  Women s Health CLINIC HOURS TELEPHONE NUMBER       Ankur Ruiz M.D.    Ruth-MARGARET Corbett-MARGARET Li-Medical Assistant    Monie-  Erica-     Monday-[place]  8:00a.m-4:45 p.m  Tuesday-[place]  9:00a.m-4:00 p.m  Wednesday-[place] 8:00a.m-4:45 p.m.  Thursday-[place]  8:00a.m-4:45 p.m.  Friday-[place]  8:00a.m-4:45 p.m. [facility]  [address] [facility] [address] [place] [address] [phone] ask for [facility]  [phone] Fax  Imaging [phone]    [facility] Labor [facility] Delivery  [address]  [place], [place] [address] [phone]    [facility]  [address] [place]  [place] [address] [phone] ask [facility]  [phone] Fax  Imaging [phone]     Urgent Care locations:    [place]        [place] Monday-Friday  5 pm - 9 pm  Saturday and Sunday   9 am - 5 pm  Monday-Friday   11 am - 9 pm  Saturday and Sunday   9 am - 5 pm   [phone]   If you need a medication refill, please contact your pharmacy. Please allow 3 business days for your refill to be completed.  As always, Thank you for trusting us with your healthcare needs!

## 2021-06-03 NOTE — PROGRESS NOTES
No significant signs, symptoms or concerns.    Counseling included the following: Advice appropriate to gestational age reviewed including pertinent Checklist items. Discussed condition, tests and care plan including risks, benefits, and alternatives. Problem list updated.   20 minutes spent on the date of encounter doing chart review, history, examination, discussion with patient, and documentation, and further activities as noted, and review of appropriateness of decision-making for care, and review of test results, and interpretation of test results.  A/P:  Nayla was seen today for prenatal care.    Diagnoses and all orders for this visit:    Supervision of other normal pregnancy, antepartum  -     UA without Microscopic        Ankur Ruiz MD

## 2021-06-23 ENCOUNTER — PRENATAL OFFICE VISIT (OUTPATIENT)
Dept: OBGYN | Facility: CLINIC | Age: 26
End: 2021-06-23
Payer: COMMERCIAL

## 2021-06-23 VITALS
SYSTOLIC BLOOD PRESSURE: 130 MMHG | OXYGEN SATURATION: 99 % | BODY MASS INDEX: 33.45 KG/M2 | WEIGHT: 201 LBS | DIASTOLIC BLOOD PRESSURE: 80 MMHG | HEART RATE: 99 BPM

## 2021-06-23 DIAGNOSIS — Z34.80 SUPERVISION OF OTHER NORMAL PREGNANCY, ANTEPARTUM: ICD-10-CM

## 2021-06-23 LAB
ALBUMIN UR-MCNC: NEGATIVE MG/DL
APPEARANCE UR: CLEAR
BILIRUB UR QL STRIP: NEGATIVE
COLOR UR AUTO: YELLOW
GLUCOSE UR STRIP-MCNC: NEGATIVE MG/DL
HGB UR QL STRIP: NEGATIVE
KETONES UR STRIP-MCNC: NEGATIVE MG/DL
LEUKOCYTE ESTERASE UR QL STRIP: ABNORMAL
NITRATE UR QL: NEGATIVE
PH UR STRIP: 6.5 PH (ref 5–7)
SOURCE: ABNORMAL
SP GR UR STRIP: 1.01 (ref 1–1.03)
UROBILINOGEN UR STRIP-ACNC: 0.2 EU/DL (ref 0.2–1)

## 2021-06-23 PROCEDURE — 99207 PR PRENATAL VISIT: CPT | Performed by: OBSTETRICS & GYNECOLOGY

## 2021-06-23 PROCEDURE — 81003 URINALYSIS AUTO W/O SCOPE: CPT | Performed by: OBSTETRICS & GYNECOLOGY

## 2021-06-23 NOTE — PATIENT INSTRUCTIONS
If you have any questions regarding your visit, Please contact your care team.     [employer] Services: [phone]  Women s Health CLINIC HOURS TELEPHONE NUMBER       Ankur Ruiz M.D.    Ruth-MARGARET Corbett-MARGARET Li-Medical Assistant    Monie-  Erica-     Monday-[place]  8:00a.m-4:45 p.m  Tuesday-[place]  9:00a.m-4:00 p.m  Wednesday-[place] 8:00a.m-4:45 p.m.  Thursday-[place]  8:00a.m-4:45 p.m.  Friday-[place]  8:00a.m-4:45 p.m. [facility]  [address] [place] [address] [phone] ask for [facility]  [phone] Fax  Imaging [phone]    [facility] Labor and Delivery  [address]  [place], [place] [address] [phone]    [facility]  [address] [place]  [place] [address] [phone] ask [facility]  [phone] Fax  Imaging [phone]     Urgent Care locations:    [place]        [place] Monday-Friday  5 pm - 9 pm  Saturday and Sunday   9 am - 5 pm  Monday-Friday   11 am - 9 pm  Saturday and Sunday   9 am - 5 pm   [phone]   If you need a medication refill, please contact your pharmacy. Please allow 3 business days for your refill to be completed.  As always, Thank you for trusting us with your healthcare needs!

## 2021-06-23 NOTE — PROGRESS NOTES
No significant signs, symptoms or concerns.    Counseling included the following: Advice appropriate to gestational age reviewed including pertinent Checklist items. Discussed condition, tests and care plan including risks, benefits, and alternatives. Problem list updated.   20 minutes spent on the date of encounter doing chart review, history, examination, discussion with patient, and documentation, and further activities as noted, and review of appropriateness of decision-making for care.  GBS next.  A/P:  Nayla was seen today for prenatal care.    Diagnoses and all orders for this visit:    Supervision of other normal pregnancy, antepartum  -     UA without Microscopic        Ankur Ruiz MD

## 2021-06-30 ENCOUNTER — PRENATAL OFFICE VISIT (OUTPATIENT)
Dept: OBGYN | Facility: CLINIC | Age: 26
End: 2021-06-30
Payer: COMMERCIAL

## 2021-06-30 VITALS
DIASTOLIC BLOOD PRESSURE: 81 MMHG | HEART RATE: 94 BPM | SYSTOLIC BLOOD PRESSURE: 130 MMHG | OXYGEN SATURATION: 99 % | WEIGHT: 204 LBS | BODY MASS INDEX: 33.95 KG/M2

## 2021-06-30 DIAGNOSIS — Z34.80 SUPERVISION OF OTHER NORMAL PREGNANCY, ANTEPARTUM: ICD-10-CM

## 2021-06-30 PROCEDURE — 99207 PR PRENATAL VISIT: CPT | Performed by: OBSTETRICS & GYNECOLOGY

## 2021-06-30 PROCEDURE — 87653 STREP B DNA AMP PROBE: CPT | Performed by: OBSTETRICS & GYNECOLOGY

## 2021-06-30 NOTE — PROGRESS NOTES
No significant signs, symptoms or concerns. Cervix posterior.    Counseling included the following: Advice appropriate to gestational age reviewed including pertinent Checklist items. Discussed condition, tests and care plan including risks, benefits, and alternatives. Problem list updated.   20 minutes spent on the date of encounter doing chart review, history, examination, discussion with patient, and documentation, and further activities as noted, and review of appropriateness of decision-making for care, and review of test results, and interpretation of test results.  A/P:  Nayla was seen today for prenatal care.    Diagnoses and all orders for this visit:    Supervision of other normal pregnancy, antepartum  -     Strep, Group B by PCR        Ankur Ruiz MD

## 2021-07-01 LAB
GP B STREP DNA SPEC QL NAA+PROBE: POSITIVE
SPECIMEN SOURCE: ABNORMAL

## 2021-07-07 ENCOUNTER — PRENATAL OFFICE VISIT (OUTPATIENT)
Dept: OBGYN | Facility: CLINIC | Age: 26
End: 2021-07-07
Payer: COMMERCIAL

## 2021-07-07 VITALS
WEIGHT: 203 LBS | BODY MASS INDEX: 33.78 KG/M2 | DIASTOLIC BLOOD PRESSURE: 77 MMHG | SYSTOLIC BLOOD PRESSURE: 116 MMHG | HEART RATE: 87 BPM | OXYGEN SATURATION: 98 %

## 2021-07-07 DIAGNOSIS — Z34.80 SUPERVISION OF OTHER NORMAL PREGNANCY, ANTEPARTUM: Primary | ICD-10-CM

## 2021-07-07 PROCEDURE — 99207 PR PRENATAL VISIT: CPT | Performed by: OBSTETRICS & GYNECOLOGY

## 2021-07-07 NOTE — PROGRESS NOTES
No significant signs, symptoms or concerns.    Counseling included the following: Advice appropriate to gestational age reviewed including pertinent Checklist items. Discussed condition, tests and care plan including risks, benefits, and alternatives. Problem list updated.   20 minutes spent on the date of encounter doing chart review, history, examination, discussion with patient, and documentation, and further activities as noted, and review of appropriateness of decision-making for care, and review of test results, and interpretation of test results.  A/P:  Nayla was seen today for prenatal care.    Diagnoses and all orders for this visit:    Supervision of other normal pregnancy, antepartum        Ankur Ruiz MD

## 2021-07-14 ENCOUNTER — PRENATAL OFFICE VISIT (OUTPATIENT)
Dept: OBGYN | Facility: CLINIC | Age: 26
End: 2021-07-14
Payer: COMMERCIAL

## 2021-07-14 VITALS
OXYGEN SATURATION: 98 % | BODY MASS INDEX: 33.78 KG/M2 | HEART RATE: 85 BPM | DIASTOLIC BLOOD PRESSURE: 78 MMHG | WEIGHT: 203 LBS | SYSTOLIC BLOOD PRESSURE: 123 MMHG

## 2021-07-14 DIAGNOSIS — Z34.80 SUPERVISION OF OTHER NORMAL PREGNANCY, ANTEPARTUM: ICD-10-CM

## 2021-07-14 PROCEDURE — 99207 PR PRENATAL VISIT: CPT | Performed by: OBSTETRICS & GYNECOLOGY

## 2021-07-14 NOTE — PATIENT INSTRUCTIONS
If you have any questions regarding your visit, Please contact your care team.     [employer] Services: [phone]  Women s Health CLINIC HOURS TELEPHONE NUMBER       Ankur Ruiz M.D.    Ruth-MARGARET Cobrett-MARGARET Li-Medical Assistant    Monie-  Erica-     Monday-[place]  8:00a.m-4:45 p.m  Tuesday-[place]  9:00a.m-4:00 p.m  Wednesday-[place] 8:00a.m-4:45 p.m.  Thursday-[place]  8:00a.m-4:45 p.m.  Friday-[place]  8:00a.m-4:45 p.m. [facility]  [address] [place] [address] [phone] ask for [facility]  [phone] Fax  Imaging [phone]    [facility]  [address]  [place], [place] [address] [phone]    [facility]  [address] [place]  [place] [address] [phone] ask [facility]  [phone] Fax  Imaging [phone]     Urgent Care locations:    [place]        [place] Monday-Friday  5 pm - 9 pm  Saturday and Sunday   9 am - 5 pm  Monday-Friday   11 am - 9 pm  Saturday and Sunday   9 am - 5 pm   [phone]   If you need a medication refill, please contact your pharmacy. Please allow 3 business days for your refill to be completed.  As always, Thank you for trusting us with your healthcare needs!

## 2021-07-14 NOTE — PROGRESS NOTES
No significant signs, symptoms or concerns.    Counseling included the following: Advice appropriate to gestational age reviewed including pertinent Checklist items. Discussed condition, tests and care plan including risks, benefits, and alternatives. Problem list updated.   20 minutes spent on the date of encounter doing chart review, history, examination, discussion with patient, and documentation, and further activities as noted, and review of appropriateness of decision-making for care.  A/P:  Nayla was seen today for prenatal care.    Diagnoses and all orders for this visit:    Supervision of other normal pregnancy, antepartum        Ankur Ruiz MD

## 2021-07-16 ENCOUNTER — TELEPHONE (OUTPATIENT)
Dept: OBGYN | Facility: CLINIC | Age: 26
End: 2021-07-16

## 2021-07-16 NOTE — TELEPHONE ENCOUNTER
Please place order for breast pump  Send encounter back to [place] pool.    [employer] in [place] fax number is    [phone]    Elba Moctezuma CMA 7/16/2021 10:21 AM

## 2021-07-16 NOTE — TELEPHONE ENCOUNTER
[facility] Call Center    Phone Message    May a detailed message be left on voicemail: no     Reason for Call: Other: Patient would like a prescription for breast pump.  Patinet will be going through [employer] in [place]. Patient does not have fax number.  Please advise. Thank you.     Action Taken: Message routed to:  [facility] p [phone]    Travel Screening: Not Applicable

## 2021-07-16 NOTE — TELEPHONE ENCOUNTER
Order placed for breast pump. Please print out and fax as requested and notify patient.   Ankur Ruiz MD

## 2021-07-21 ENCOUNTER — PRENATAL OFFICE VISIT (OUTPATIENT)
Dept: OBGYN | Facility: CLINIC | Age: 26
End: 2021-07-21
Payer: COMMERCIAL

## 2021-07-21 VITALS
DIASTOLIC BLOOD PRESSURE: 78 MMHG | SYSTOLIC BLOOD PRESSURE: 127 MMHG | WEIGHT: 206 LBS | HEART RATE: 83 BPM | OXYGEN SATURATION: 99 % | BODY MASS INDEX: 34.28 KG/M2

## 2021-07-21 DIAGNOSIS — Z34.80 SUPERVISION OF OTHER NORMAL PREGNANCY, ANTEPARTUM: ICD-10-CM

## 2021-07-21 PROCEDURE — 99207 PR PRENATAL VISIT: CPT | Performed by: OBSTETRICS & GYNECOLOGY

## 2021-07-21 NOTE — PATIENT INSTRUCTIONS
If you have any questions regarding your visit, Please contact your care team.     [employer] Services: [phone]  Women s Health CLINIC HOURS TELEPHONE NUMBER       Ankur Ruiz M.D.    Ruth-MARGARET Corbett-MARGARET Li-Medical Assistant    [place]ie-  Erica-     Monday-[place]  8:00a.m-4:45 p.m  Tuesday-[place]  9:00a.m-4:00 p.m  Wednesday-[place] 8:00a.m-4:45 p.m.  Thursday-[place]  8:00a.m-4:45 p.m.  Friday-[place]  8:00a.m-4:45 p.m. [facility]  [address] [facility] [place] [place] [address] [phone] ask for [facility]  [phone] Fax  Imaging [phone]-[phone]    [facility] Labor and Delivery  [address]  [place], [place] [address] [phone]    [facility]  [address] [place]  [place] [address] [phone] ask [facility]  [phone] Fax  Imaging [id number]-[phone]     Urgent Care locations:    [place]        [place] Monday-Friday  5 pm - 9 pm  Saturday and Sunday   9 am - 5 pm  Monday-Friday   11 am - 9 pm  Saturday and Sunday   9 am - 5 pm   [phone]   If you need a medication refill, please contact your pharmacy. Please allow 3 business days for your refill to be completed.  As always, Thank you for trusting us with your healthcare needs!

## 2021-07-21 NOTE — PROGRESS NOTES
No significant signs, symptoms or concerns. Desires to defer induction until 41-42 weeks. Call next week. Check BPP soon.    Counseling included the following: Advice appropriate to gestational age reviewed including pertinent Checklist items. Discussed condition, tests and care plan including risks, benefits, and alternatives. Problem list updated.   20 minutes spent on the date of encounter doing chart review, history, examination, discussion with patient, and documentation, and further activities as noted, and review of appropriateness of decision-making for care.  A/P:  Nayla was seen today for prenatal care.    Diagnoses and all orders for this visit:    Supervision of other normal pregnancy, antepartum  -     US Fetal Biophys Prof w/o Non Stress Test; Future        Ankur Ruiz MD

## 2021-07-29 ENCOUNTER — TELEPHONE (OUTPATIENT)
Dept: OBGYN | Facility: CLINIC | Age: 26
End: 2021-07-29

## 2021-07-29 NOTE — TELEPHONE ENCOUNTER
Pt had  on 2021.    Pt calling today for concerns regarding her bleeding following delivery. Pt states she is currently having light brown bleeding/spotting with occasional bright red spotting after increased activity. Pt states she passed a small clot/ tissue when she got home from delivery but nothing since.    Pt denies any pain, vaginal itching, burning or abnormal discharge. Pt denies any fever.    RN relayed this is not uncommon and should slowly taper down the next few days. RN advised to take it easy with activity and gave bleeding precautions as well as when she should call for concerns.    Patient verbalized understanding and agreed to plan.     Patient was given the opportunity to ask additional questions and have them answered. Patient had no further questions.     Ruth Gruber RN on 2021 at 2:33 PM

## 2021-08-20 ENCOUNTER — TELEPHONE (OUTPATIENT)
Dept: OBGYN | Facility: CLINIC | Age: 26
End: 2021-08-20

## 2021-08-20 NOTE — TELEPHONE ENCOUNTER
[facility] Call Center    Phone Message    May a detailed message be left on voicemail: yes     Reason for Call: Pt is having trouble with bowel movements and also having some bleeding.  She is also wondering if she having some type of prolapse.  She'd like to discuss if she needs to be seen right away.  Thanks.    Action Taken: Message routed to:  [id number]    Travel Screening: Not Applicable                                                                       [facility] Mobile Crisis Response Services  (contact the county where the person is physically located at the time of the crisis)  *[place] (Child and Adult):    [phone]  *[place] (Child and Adult):    [phone]  *[place] (Child and Adult):    [phone]  *[place] (Child):    [phone]    (Adult):    [phone]  *[place] (Child):    [phone]   (Adult):    [phone]  *[place] (Child and Adult):    [phone]  *[place] (Child and Adult):    [phone]  Other Crisis Resources (non-mobile)  *[facility]):    [phone]    Walk-in and telephone crisis intervention services (focuses on >18 year-olds)    Located at [address] [address]  *Suicide Hotlines:    284.512.3506  or  6-154-VPDIPUV (221-9421)  or  1-895-342-TALK (1286)   Text Telephone:    8-988-379-3TTY (6938)   LGBT Youth Suicide Hotline:    0-533-4-U-JAMI  *[facility] [place] ( women and children):    [phone]  or  636.737.8922  *[facility] Crisis Line ( women and children):    [phone]       Short-term Residential Crisis Resources  *[facility] (ages 10-17):  [phone]     [address] [place] [address]   *[facility] (Birth - 6 years):    [phone]  *[facility] (Birth - 12 years):    [phone]       Inpatient Hospitalization and Residential Evaluation  *[facility] (Child and Adult)     [address] [place] [address]    Inpatient Intake [phone]    [facility]:    [phone]    Day Treatment/Behavioral Intake:    [phone]  *[facility] (Adult):    [phone]    Patient Education     Depression  Depression is one of the most common mental health problems  today. It is not just a state of unhappiness or sadness. It is a true disease. The cause seems to be related to a decrease in chemicals that transmit signals in the brain. Having a family history of depression, alcoholism, or suicide increases the risk. Chronic illness, chronic pain, migraine headaches, and high emotional stress also increase the risk.  Depression is something we tend to recognize in others, but may have a hard time seeing in ourselves. It can show in many physical and emotional ways:    Loss of appetite    Overeating    Not being able to sleep    Sleeping too much    Tiredness not related to physical exertion    Restlessness or irritability    Slowness of movement or speech    Feeling depressed or withdrawn    Loss of interest in things you once enjoyed    Trouble concentrating, poor memory, trouble making decisions    Thoughts of harming or killing oneself, or thoughts that life is not worth living    Low self-esteem  The treatment for depression may include both medicine and psychotherapy. Antidepressants can reduce suffering and can improve the ability to function during the depressed period. Therapy can offer emotional support and help you understand emotional factors that may be causing the depression.  Home care    Ongoing care and support help people manage this disease. Find a healthcare provider and therapist who meet your needs. Seek help when you feel like you may be getting ill.    Be kind to yourself. Make it a point to do things that you enjoy (gardening, walking in nature, going to a movie). Reward yourself for small successes.    Take care of your physical body. Eat a balanced diet (low in saturated fat and high in fruits and vegetables). Exercise at least 3 times a week for 30 minutes. Even mild-moderate exercise (like brisk walking) can make you feel better.    Don't drink alcohol, which can make depression worse.    Take medicine as prescribed.    Tell each of your healthcare  providers about all of the prescription and over-the-counter medicines, vitamins, and supplements you take. Certain supplements interact with medicines and can result in dangerous side effects. Ask your pharmacist when you have questions about medicine interactions.    Talk with your family and trusted friends about your feelings and thoughts. Ask them to help you recognize behavior changes early so you can get help and, if needed, medicine can be adjusted.    Follow-up care  Follow up with your healthcare provider, or as advised.  Call 911  Call 911 if you:    Have suicidal thoughts, a suicide plan, and the means to carry out the plan; or serious thoughts of hurting someone else     Have trouble breathing    Are very confused    Feel very drowsy or have trouble awakening    Faint or lose consciousness    Have new chest pain that becomes more severe, lasts longer, or spreads into your shoulder, arm, neck, jaw, or back  When to seek medical advice  Call your healthcare provider right away if any of these happen:    Feeling extreme depression, fear, anxiety, or anger toward yourself or others    Feeling out of control    Feeling that you may try to harm yourself or another    Hearing voices that others do not hear    Seeing things that others do not see    Can t sleep or eat for 3 days in a row    Friends or family express concern over your behavior and ask you to seek help  [employer] last reviewed this educational content on 10/1/2017    6873-2680 [employer]. [address] [place] [address]. All rights reserved. This information is not intended as a substitute for professional medical care. Always follow your healthcare professional's instructions.           Patient Education     Counseling for Depression  For some people, counseling can work as well as medicine for mild to moderate depression. Counseling is also called talk therapy. When done by a trained professional, this treatment is a  powerful way to better understand your thoughts and feelings. Like medicine, it may take time before you notice how much counseling is helping.     Kinds of talk therapy  Different counselors use different methods for talk therapy. But all therapy aims to help change how you think about your problem. Most therapy for depression is often done one-on-one. But it may also be done in a group setting. You and your healthcare provider can discuss the type of therapy you think would work best for you. You can also discuss who the best person is to provide the therapy.   How therapy helps  Talking about your problems can help them seem less overwhelming. It can help work through problems you have with your life and your relationships. It can also help you understand how depression is clouding your thinking, not letting you see the world the way it really is. Therapy can give you:     Insight about your emotions    New tools for dealing with your problems    Emotional support for making progress  Getting better takes time  Talk therapy can help you feel better. But change doesn t happen right away. Depression takes away your energy and motivation. So it can be hard to feel like going to therapy and sticking with it. But therapy has been proven to be very valuable in the treatment of depression. Therapy for depression is often done for a set number of sessions. In other cases, you and your therapist decide together at what point you no longer need therapy.   Additional sources of help  In addition to a professional counselor, it may help to talk to other people in your life. You may find support and insight from:     A close friend or family member    A  trained in counseling    A local support group or community group    A 12-step program such as Alcoholics Anonymous for dealing with problems that can contribute to depression, such as alcohol or drug addiction  [employer] last reviewed this educational content on  9/1/2019 2000-2020 [employer]. [address] [place] [address], [place] [address]. All rights reserved. This information is not intended as a substitute for professional medical care. Always follow your healthcare professional's instructions.

## 2021-08-27 ENCOUNTER — PRENATAL OFFICE VISIT (OUTPATIENT)
Dept: OBGYN | Facility: CLINIC | Age: 26
End: 2021-08-27
Payer: COMMERCIAL

## 2021-08-27 VITALS
OXYGEN SATURATION: 99 % | HEART RATE: 78 BPM | WEIGHT: 188 LBS | SYSTOLIC BLOOD PRESSURE: 125 MMHG | BODY MASS INDEX: 31.28 KG/M2 | DIASTOLIC BLOOD PRESSURE: 83 MMHG

## 2021-08-27 PROBLEM — Z34.80 SUPERVISION OF OTHER NORMAL PREGNANCY, ANTEPARTUM: Status: RESOLVED | Noted: 2020-12-16 | Resolved: 2021-08-27

## 2021-08-27 PROBLEM — Z29.13 NEED FOR RHOGAM DUE TO RH NEGATIVE MOTHER: Status: RESOLVED | Noted: 2020-12-16 | Resolved: 2021-08-27

## 2021-08-27 PROBLEM — O99.820 GBS (GROUP B STREPTOCOCCUS CARRIER), +RV CULTURE, CURRENTLY PREGNANT: Status: RESOLVED | Noted: 2019-02-25 | Resolved: 2021-08-27

## 2021-08-27 PROCEDURE — G0145 SCR C/V CYTO,THINLAYER,RESCR: HCPCS | Performed by: OBSTETRICS & GYNECOLOGY

## 2021-08-27 PROCEDURE — 99207 PR POST PARTUM EXAM: CPT | Performed by: OBSTETRICS & GYNECOLOGY

## 2021-08-27 ASSESSMENT — PATIENT HEALTH QUESTIONNAIRE - PHQ9: SUM OF ALL RESPONSES TO PHQ QUESTIONS 1-9: 0

## 2021-08-27 NOTE — PROGRESS NOTES
Nayla Mcmanus is a 25 year old year old G 4 P 3 who is here today for a postpartum exam.    HPI:      Doing well and without signif sx or concerns except some vaginal prolapse. Currently breast and pump feeding infant. Here today alone. Infant doing well. Contraceptive method planned is condoms. NSA since delivery. PP depression screening as noted. See PHQ-9. Score = 0.    Past medical, obstetrical, surgical, family and social history reviewed and as noted or updated in chart.     Allergies, meds and supplements are as noted or updated in chart.      ROS:     Systems reviewed include constitutional; breast;                 cardiac; respiratory; gastrointestinal; genitourinary;                                musculoskeletal; integumentary; psychological;                                hematologic/lymphatic and endocrine.                  These systems were negative for significant symptoms except                 for the following: none and see HPI.                                Exam:  VS as noted.                    Pelvis is                             normal or negative except for, or in particular noting, the following                pertinent findings: grade 1-2 cystocele. Kegels instructions.      Assessment: Postpartum exam    Plan and Recommendations: Counseling included the following: Symptoms, problems and concerns reviewed. Discussed pregnancy, birth, future pregnancy plans, work plans and infant care issues.  Problem list updated and Pregnancy Episode closed. See orders. Return to clinic in 12 months.  30 minutes spent on the date of encounter doing chart review, history, examination, discussion with patient, and documentation, and further activities as noted, and review of appropriateness of decision-making for care.    Nayla was seen today for post partum exam.    Diagnoses and all orders for this visit:    Routine postpartum follow-up  -     Pap thin layer screen reflex to HPV if ASCUS -  recommended age 25 - 29 years; Future  -     Pap thin layer screen reflex to HPV if ASCUS - recommended age 25 - 29 years        Ankur Ruiz MD

## 2021-08-27 NOTE — PATIENT INSTRUCTIONS
If you have any questions regarding your visit, Please contact your care team.     [employer] [employer] [facility]: [phone]  [facility] CLINIC HOURS TELEPHONE NUMBER       Ankur Ruiz M.D.    Ruth-MARGARET Corbett-MARGARET Li-Medical Assistant    Monie-  Erica-     Monday-[place]  8:00a.m-4:45 p.m  Tuesday-[place]  9:00a.m-4:00 p.m  Wednesday-[place] 8:00a.m-4:45 p.m.  Thursday-[place]  8:00a.m-4:45 p.m.  Friday-[place]  8:00a.m-4:45 p.m. [facility]  [address] [address] [facility] [place] [id number] [phone] ask for [facility]  [phone] Fax  Imaging Fasqyrtfnf-[phone]    [facility] Delivery  [address]  [place], [place] [address] [phone]    [facility]  [address] [place]  [place] [address] [phone] ask [facility]  [phone] Fax  Imaging [phone]     Urgent Care locations:    [place]        [place] Monday-Friday  5 pm - 9 pm  Saturday and Sunday   9 am - 5 pm  Monday-Friday   11 am - 9 pm  Saturday and Sunday   9 am - 5 pm   [phone]   If you need a medication refill, please contact your pharmacy. Please allow 3 business days for your refill to be completed.  As always, Thank you for trusting us with your healthcare needs!

## 2021-08-31 LAB
BKR LAB AP GYN ADEQUACY: NORMAL
BKR LAB AP GYN INTERPRETATION: NORMAL
BKR LAB AP HPV REFLEX: NORMAL
BKR LAB AP PREVIOUS ABNORMAL: NORMAL
PATH REPORT.COMMENTS IMP SPEC: NORMAL
PATH REPORT.RELEVANT HX SPEC: NORMAL

## 2021-09-25 ENCOUNTER — HEALTH MAINTENANCE LETTER (OUTPATIENT)
Age: 26
End: 2021-09-25

## 2023-01-07 ENCOUNTER — HEALTH MAINTENANCE LETTER (OUTPATIENT)
Age: 28
End: 2023-01-07

## 2023-09-23 ENCOUNTER — HEALTH MAINTENANCE LETTER (OUTPATIENT)
Age: 28
End: 2023-09-23

## 2024-10-16 NOTE — PROGRESS NOTES
No signif signs, symptoms or concerns. Has M follow-up planned. Advice appropriate to gestational age reviewed including pertinent Checklist items. Discussed condition, tests and care plan including RBA. Problem list updated. Rhogam and considering 1h GTT next.  A/P:  Nayla was seen today for prenatal care.    Diagnoses and all orders for this visit:    Supervision of other normal pregnancy, antepartum    Need for rhogam due to Rh negative mother        Ankur Ruiz MD          hide